# Patient Record
Sex: FEMALE | Race: WHITE | HISPANIC OR LATINO | Employment: STUDENT | URBAN - METROPOLITAN AREA
[De-identification: names, ages, dates, MRNs, and addresses within clinical notes are randomized per-mention and may not be internally consistent; named-entity substitution may affect disease eponyms.]

---
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
## 2022-05-06 ENCOUNTER — OFFICE VISIT (OUTPATIENT)
Dept: OBGYN CLINIC | Facility: CLINIC | Age: 22
End: 2022-05-06

## 2022-05-06 VITALS
WEIGHT: 127.8 LBS | SYSTOLIC BLOOD PRESSURE: 118 MMHG | HEIGHT: 60 IN | DIASTOLIC BLOOD PRESSURE: 58 MMHG | BODY MASS INDEX: 25.09 KG/M2

## 2022-05-06 DIAGNOSIS — N92.0 MENORRHAGIA WITH REGULAR CYCLE: ICD-10-CM

## 2022-05-06 DIAGNOSIS — Z01.419 WOMEN'S ANNUAL ROUTINE GYNECOLOGICAL EXAMINATION: Primary | ICD-10-CM

## 2022-05-06 DIAGNOSIS — Z12.39 ENCOUNTER FOR BREAST CANCER SCREENING USING NON-MAMMOGRAM MODALITY: ICD-10-CM

## 2022-05-06 DIAGNOSIS — Z30.41 ORAL CONTRACEPTIVE PILL SURVEILLANCE: ICD-10-CM

## 2022-05-06 PROCEDURE — 99385 PREV VISIT NEW AGE 18-39: CPT | Performed by: NURSE PRACTITIONER

## 2022-05-06 PROCEDURE — 1036F TOBACCO NON-USER: CPT | Performed by: NURSE PRACTITIONER

## 2022-05-06 PROCEDURE — 3725F SCREEN DEPRESSION PERFORMED: CPT | Performed by: NURSE PRACTITIONER

## 2022-05-06 PROCEDURE — 3008F BODY MASS INDEX DOCD: CPT | Performed by: NURSE PRACTITIONER

## 2022-05-06 PROCEDURE — 0503F POSTPARTUM CARE VISIT: CPT | Performed by: NURSE PRACTITIONER

## 2022-05-06 RX ORDER — NORGESTIMATE AND ETHINYL ESTRADIOL 7DAYSX3 LO
1 KIT ORAL DAILY
Qty: 90 TABLET | Refills: 4 | Status: SHIPPED | OUTPATIENT
Start: 2022-05-06

## 2022-05-06 NOTE — PROGRESS NOTES
[address] [facility] Josesito Quiros is a 25 y o  female who presents today for annual GYN exam  She is transferring care to our office today  She has no pap history due to her age  Has completed the HPV vaccine series  She is sexually active in a monogamous, long term relationship with a male partner  She has been using OCPs for menorrhagia with iron deficiency anemia  She reports menses as light and regular  Patient's last menstrual period was 2022  Her general medical history has been reviewed and she reports it as follows:    Past Medical History:   Diagnosis Date    Anemia      Past Surgical History:   Procedure Laterality Date    WISDOM TOOTH EXTRACTION       OB History        0    Para   0    Term   0       0    AB   0    Living   0       SAB   0    IAB   0    Ectopic   0    Multiple   0    Live Births   0               Social History     Tobacco Use    Smoking status: Never Smoker    Smokeless tobacco: Never Used   Vaping Use    Vaping Use: Never used   Substance Use Topics    Alcohol use: Not Currently    Drug use: Never     Social History     Substance and Sexual Activity   Sexual Activity Yes    Partners: Male    Birth control/protection: Condom Male, OCP     Cancer-related family history is negative for Breast cancer, Colon cancer, and Ovarian cancer  Current Outpatient Medications   Medication Instructions    ferrous sulfate 324 mg, Oral, 2 times daily before meals    mupirocin (BACTROBAN) 2 % ointment Topical, 2 times daily    norgestimate-ethinyl estradiol (ORTHO TRI-CYCLEN LO) 0 18/0 215/0 25 MG-25 MCG per tablet 1 tablet, Oral, Daily       Review of Systems:  Review of Systems   Constitutional: Negative  Gastrointestinal: Negative  Genitourinary: Negative  Skin: Negative          Physical Exam:  /58   Ht 5' (1 524 m)   Wt 58 kg (127 lb 12 8 oz)   LMP 2022   BMI 24 96 kg/m²   Physical Exam  Constitutional: General: She is not in acute distress  Appearance: Normal appearance  She is normal weight  Genitourinary:   Breasts:      Right: No mass, nipple discharge, skin change or tenderness  Left: No mass, nipple discharge, skin change or tenderness  Cardiovascular:      Rate and Rhythm: Normal rate and regular rhythm  Pulmonary:      Breath sounds: Normal breath sounds  Abdominal:      General: Abdomen is flat  Palpations: Abdomen is soft  There is no mass  Tenderness: There is no abdominal tenderness  Musculoskeletal:      Cervical back: Neck supple  No tenderness  Neurological:      Mental Status: She is alert  Skin:     General: Skin is warm and dry  Psychiatric:         Mood and Affect: Mood normal          Behavior: Behavior normal    Vitals reviewed  Assessment/Plan:   1  Normal well-woman GYN exam   2  Cervical cancer screening:  Discussed pap smear screenings to begin at age 24  Pelvic exam deferred today  3  STD screening:  Patient declines  4  Breast cancer screening:  Normal breast exam  Reviewed breast self-awareness  5  Depression Screening: Patient's depression screening was assessed with a PHQ-2 score of 0  Their PHQ-9 score was 0  Clinically patient does not have depression  No treatment is required  6  BMI Counseling: Body mass index is 24 96 kg/m²  Discussed the patient's BMI with her  The BMI is normal     7  Contraception:  OCPs  Refills provided x1 year   8  Return to office in one year for annual exam or sooner if needed  Reviewed with patient that [facility], but that they will not necessarily be reviewed by me immediately  Explained that I will review results at my earliest opportunity and contact patient appropriately

## 2022-06-22 ENCOUNTER — OFFICE VISIT (OUTPATIENT)
Dept: PEDIATRICS CLINIC | Facility: CLINIC | Age: 22
End: 2022-06-22
Payer: COMMERCIAL

## 2022-06-22 VITALS
BODY MASS INDEX: 21.4 KG/M2 | WEIGHT: 125.38 LBS | SYSTOLIC BLOOD PRESSURE: 118 MMHG | TEMPERATURE: 98.5 F | DIASTOLIC BLOOD PRESSURE: 62 MMHG | HEIGHT: 64 IN

## 2022-06-22 DIAGNOSIS — Z71.82 EXERCISE COUNSELING: ICD-10-CM

## 2022-06-22 DIAGNOSIS — Z01.00 EXAMINATION OF EYES AND VISION: ICD-10-CM

## 2022-06-22 DIAGNOSIS — Z00.129 HEALTH CHECK FOR CHILD OVER 28 DAYS OLD: Primary | ICD-10-CM

## 2022-06-22 DIAGNOSIS — Z01.00 VISUAL TESTING: ICD-10-CM

## 2022-06-22 DIAGNOSIS — Z13.31 SCREENING FOR DEPRESSION: ICD-10-CM

## 2022-06-22 DIAGNOSIS — Z11.4 SCREENING FOR HIV (HUMAN IMMUNODEFICIENCY VIRUS): ICD-10-CM

## 2022-06-22 DIAGNOSIS — Z01.10 AUDITORY ACUITY EVALUATION: ICD-10-CM

## 2022-06-22 DIAGNOSIS — Z13.220 SCREENING, LIPID: ICD-10-CM

## 2022-06-22 DIAGNOSIS — Z01.84 IMMUNITY STATUS TESTING: ICD-10-CM

## 2022-06-22 DIAGNOSIS — Z01.10 ENCOUNTER FOR HEARING EXAMINATION WITHOUT ABNORMAL FINDINGS: ICD-10-CM

## 2022-06-22 DIAGNOSIS — Z11.59 NEED FOR HEPATITIS C SCREENING TEST: ICD-10-CM

## 2022-06-22 DIAGNOSIS — Z11.3 SCREEN FOR SEXUALLY TRANSMITTED DISEASES: ICD-10-CM

## 2022-06-22 DIAGNOSIS — Z71.3 NUTRITIONAL COUNSELING: ICD-10-CM

## 2022-06-22 PROBLEM — S92.515A CLOSED NONDISPLACED FRACTURE OF PROXIMAL PHALANX OF LESSER TOE OF LEFT FOOT: Status: ACTIVE | Noted: 2021-05-25

## 2022-06-22 PROCEDURE — 87591 N.GONORRHOEAE DNA AMP PROB: CPT | Performed by: PEDIATRICS

## 2022-06-22 PROCEDURE — 3008F BODY MASS INDEX DOCD: CPT | Performed by: NURSE PRACTITIONER

## 2022-06-22 PROCEDURE — 87491 CHLMYD TRACH DNA AMP PROBE: CPT | Performed by: PEDIATRICS

## 2022-06-22 PROCEDURE — 96127 BRIEF EMOTIONAL/BEHAV ASSMT: CPT | Performed by: PEDIATRICS

## 2022-06-22 PROCEDURE — 92551 PURE TONE HEARING TEST AIR: CPT | Performed by: PEDIATRICS

## 2022-06-22 PROCEDURE — 99395 PREV VISIT EST AGE 18-39: CPT | Performed by: PEDIATRICS

## 2022-06-22 PROCEDURE — 99173 VISUAL ACUITY SCREEN: CPT | Performed by: PEDIATRICS

## 2022-06-22 NOTE — PATIENT INSTRUCTIONS
Wellness Visit for Adults   AMBULATORY CARE:   A wellness visit  is when you see your healthcare provider to get screened for health problems  Your healthcare provider will also give you advice on how to stay healthy  Write down your questions so you remember to ask them  Ask your healthcare provider how often you should have a wellness visit  What happens at a wellness visit:  Your healthcare provider will ask about your health, and your family history of health problems  This includes high blood pressure, heart disease, and cancer  He or she will ask if you have symptoms that concern you, if you smoke, and about your mood  You may also be asked about your intake of medicines, supplements, food, and alcohol  Any of the following may be done: Your weight  will be checked  Your height may also be checked so your body mass index (BMI) can be calculated  Your BMI shows if you are at a healthy weight  Your blood pressure  and heart rate will be checked  Your temperature may also be checked  Blood and urine tests  may be done  Blood tests may be done to check your cholesterol levels  Abnormal cholesterol levels increase your risk for heart disease and stroke  You may also need a blood or urine test to check for diabetes if you are at increased risk  Urine tests may be done to look for signs of an infection or kidney disease  A physical exam  includes checking your heartbeat and lungs with a stethoscope  Your healthcare provider may also check your skin to look for sun damage  Screening tests  may be recommended  A screening test is done to check for diseases that may not cause symptoms  The screening tests you may need depend on your age, gender, family history, and lifestyle habits  For example, colorectal screening may be recommended if you are 48years old or older  Screening tests you need if you are a woman:   A Pap smear  is used to screen for cervical cancer   Pap smears are usually done every 3 to 5 years depending on your age  You may need them more often if you have had abnormal Pap smear test results in the past  Ask your healthcare provider how often you should have a Pap smear  A mammogram  is an x-ray of your breasts to screen for breast cancer  Experts recommend mammograms every 2 years starting at age 48 years  You may need a mammogram at age 52 years or younger if you have an increased risk for breast cancer  Talk to your healthcare provider about when you should start having mammograms and how often you need them  Vaccines you may need:   Get an influenza vaccine  every year  The influenza vaccine protects you from the flu  Several types of viruses cause the flu  The viruses change over time, so new vaccines are made each year  Get a tetanus-diphtheria (Td) booster vaccine  every 10 years  This vaccine protects you against tetanus and diphtheria  Tetanus is a severe infection that may cause painful muscle spasms and lockjaw  Diphtheria is a severe bacterial infection that causes a thick covering in the back of your mouth and throat  Get a human papillomavirus (HPV) vaccine  if you are female and aged 23 to 32 or male 23 to 24 and never received it  This vaccine protects you from HPV infection  HPV is the most common infection spread by sexual contact  HPV may also cause vaginal, penile, and anal cancers  Get a pneumococcal vaccine  if you are aged 72 years or older  The pneumococcal vaccine is an injection given to protect you from pneumococcal disease  Pneumococcal disease is an infection caused by pneumococcal bacteria  The infection may cause pneumonia, meningitis, or an ear infection  Get a shingles vaccine  if you are 60 or older, even if you have had shingles before  The shingles vaccine is an injection to protect you from the varicella-zoster virus  This is the same virus that causes chickenpox   Shingles is a painful rash that develops in people who had chickenpox or have been exposed to the virus  How to eat healthy:  My Plate is a model for planning healthy meals  It shows the types and amounts of foods that should go on your plate  Fruits and vegetables make up about half of your plate, and grains and protein make up the other half  A serving of dairy is included on the side of your plate  The amount of calories and serving sizes you need depends on your age, gender, weight, and height  Examples of healthy foods are listed below:  Eat a variety of vegetables  such as dark green, red, and orange vegetables  You can also include canned vegetables low in sodium (salt) and frozen vegetables without added butter or sauces  Eat a variety of fresh fruits , canned fruit in 100% juice, frozen fruit, and dried fruit  Include whole grains  At least half of the grains you eat should be whole grains  Examples include whole-wheat bread, wheat pasta, brown rice, and whole-grain cereals such as oatmeal     Eat a variety of protein foods such as seafood (fish and shellfish), lean meat, and poultry without skin (turkey and chicken)  Examples of lean meats include pork leg, shoulder, or tenderloin, and beef round, sirloin, tenderloin, and extra lean ground beef  Other protein foods include eggs and egg substitutes, beans, peas, soy products, nuts, and seeds  Choose low-fat dairy products such as skim or 1% milk or low-fat yogurt, cheese, and cottage cheese  Limit unhealthy fats  such as butter, hard margarine, and shortening  Exercise:  Exercise at least 30 minutes per day on most days of the week  Some examples of exercise include walking, biking, dancing, and swimming  You can also fit in more physical activity by taking the stairs instead of the elevator or parking farther away from stores  Include muscle strengthening activities 2 days each week  Regular exercise provides many health benefits   It helps you manage your weight, and decreases your risk for type 2 diabetes, heart disease, stroke, and high blood pressure  Exercise can also help improve your mood  Ask your healthcare provider about the best exercise plan for you  General health and safety guidelines:   Do not smoke  Nicotine and other chemicals in cigarettes and cigars can cause lung damage  Ask your healthcare provider for information if you currently smoke and need help to quit  E-cigarettes or smokeless tobacco still contain nicotine  Talk to your healthcare provider before you use these products  Limit alcohol  A drink of alcohol is 12 ounces of beer, 5 ounces of wine, or 1½ ounces of liquor  Lose weight, if needed  Being overweight increases your risk of certain health conditions  These include heart disease, high blood pressure, type 2 diabetes, and certain types of cancer  Protect your skin  Do not sunbathe or use tanning beds  Use sunscreen with a SPF 15 or higher  Apply sunscreen at least 15 minutes before you go outside  Reapply sunscreen every 2 hours  Wear protective clothing, hats, and sunglasses when you are outside  Drive safely  Always wear your seatbelt  Make sure everyone in your car wears a seatbelt  A seatbelt can save your life if you are in an accident  Do not use your cell phone when you are driving  This could distract you and cause an accident  Pull over if you need to make a call or send a text message  Practice safe sex  Use latex condoms if are sexually active and have more than one partner  Your healthcare provider may recommend screening tests for sexually transmitted infections (STIs)  Wear helmets, lifejackets, and protective gear  Always wear a helmet when you ride a bike or motorcycle, go skiing, or play sports that could cause a head injury  Wear protective equipment when you play sports  Wear a lifejacket when you are on a boat or doing water sports      © Copyright [employer] 2022 Information is for End User's use only and may not be sold, redistributed or otherwise used for commercial purposes  All illustrations and images included in CareNotes® are the copyrighted property of A D A M , Inc  or Tristan Phipps  The above information is an  only  It is not intended as medical advice for individual conditions or treatments  Talk to your doctor, nurse or pharmacist before following any medical regimen to see if it is safe and effective for you

## 2022-06-22 NOTE — PROGRESS NOTES
Subjective:     Rose Belcher is a 25 y o  female who is brought in for this well child visit  History provided by: patient    Current Issues:  Current concerns: none  Pt is applying for nursing school through [employer]  Patient brought the admission papers to be filled out by me  regular periods, no issues    The following portions of the patient's history were reviewed and updated as appropriate: allergies, current medications, past family history, past medical history, past social history, past surgical history and problem list     Well Child Assessment:  History provided by: patient  Aditi Barragan lives with her mother and father  Nutrition  Types of intake include cereals, cow's milk, eggs, fish, fruits, juices, junk food, meats and vegetables  Junk food includes candy, chips, desserts and fast food (sometimes)  Dental  The patient has a dental home  The patient does not brush teeth regularly  The patient does not floss regularly  Last dental exam was less than 6 months ago  Elimination  Elimination problems do not include constipation, diarrhea or urinary symptoms  There is no bed wetting  Sleep  Average sleep duration is 7 hours  The patient does not snore  There are no sleep problems  Safety  There is no smoking in the home  Home has working smoke alarms? yes  Home has working carbon monoxide alarms? yes  There is no gun in home  School  Grade level in school: 3rd year of college at [employer]  There are no signs of learning disabilities  Child is doing well in school  Social  After school activity: pt works , part time              Objective:       Vitals:    06/22/22 1028   BP: 118/62   BP Location: Left arm   Patient Position: Sitting   Cuff Size: Standard   Temp: 98 5 °F (36 9 °C)   TempSrc: Tympanic   Weight: 56 9 kg (125 lb 6 oz)   Height: 5' 4 29" (1 633 m)     Growth parameters are noted and are appropriate for age      Wt Readings from Last 1 Encounters:   06/22/22 56 9 kg (125 lb 6 oz) (44 %, Z= -0 14)*     * Growth percentiles are based on [facility] (Girls, 2-20 Years) data  Ht Readings from Last 1 Encounters:   06/22/22 5' 4 29" (1 633 m) (50 %, Z= -0 01)*     * Growth percentiles are based on [facility] (Girls, 2-20 Years) data  Body mass index is 21 33 kg/m²  Vitals:    06/22/22 1028   BP: 118/62   BP Location: Left arm   Patient Position: Sitting   Cuff Size: Standard   Temp: 98 5 °F (36 9 °C)   TempSrc: Tympanic   Weight: 56 9 kg (125 lb 6 oz)   Height: 5' 4 29" (1 633 m)        Hearing Screening    125Hz 250Hz 500Hz 1000Hz 2000Hz 3000Hz 4000Hz 6000Hz 8000Hz   Right ear:   25 25 25  25     Left ear:   25 25 25  25        Visual Acuity Screening    Right eye Left eye Both eyes   Without correction: 20/25 20/25 20/20   With correction:          Physical Exam  Constitutional:       General: She is not in acute distress  Appearance: Normal appearance  She is well-developed and normal weight  HENT:      Head: Normocephalic  Right Ear: Tympanic membrane, ear canal and external ear normal       Left Ear: Tympanic membrane, ear canal and external ear normal       Nose: Nose normal       Mouth/Throat:      Mouth: Mucous membranes are moist       Pharynx: Uvula midline  Comments: Teeth are wnl  Eyes:      General: Lids are normal       Extraocular Movements: Extraocular movements intact  Conjunctiva/sclera: Conjunctivae normal       Pupils: Pupils are equal, round, and reactive to light  Cardiovascular:      Rate and Rhythm: Normal rate and regular rhythm  Pulses: Normal pulses  Femoral pulses are 2+ on the right side and 2+ on the left side  Heart sounds: Normal heart sounds  No murmur (No murmurs heard) heard  Pulmonary:      Effort: Pulmonary effort is normal  No respiratory distress  Breath sounds: Normal breath sounds  Comments: Breasts are wnl   No lumps appreciated on exam henry 5   Abdominal:      General: Bowel sounds are normal  There is no distension  Palpations: Abdomen is soft  There is no mass  Tenderness: There is no abdominal tenderness  Comments: No hepatosplenomegaly felt   Genitourinary:     Comments: deferred  Musculoskeletal:         General: No deformity  Normal range of motion  Cervical back: Neck supple  Comments: No abnormality noted  Muscle tone seems normal   No joint swelling  Range of motion of joints seems normal    Scoliosis noted: no   Lymphadenopathy:      Cervical: No cervical adenopathy  Skin:     General: Skin is warm  Capillary Refill: Capillary refill takes less than 2 seconds  Coloration: Skin is not pale  Neurological:      General: No focal deficit present  Mental Status: She is alert and oriented to person, place, and time  Cranial Nerves: No cranial nerve deficit  Deep Tendon Reflexes: Reflexes are normal and symmetric  Comments: No neurological abnormality noted   Psychiatric:         Mood and Affect: Mood normal          Behavior: Behavior normal          Thought Content: Thought content normal            Assessment:     Well adolescent  1  Health check for child over 34 days old     2  Screening for depression     3  Auditory acuity evaluation     4  Examination of eyes and vision     5  Immunity status testing  Quantiferon TB Gold Plus    Hepatitis B surface antibody   6  Need for hepatitis C screening test  Hepatitis C RNA, quantitative, PCR   7  Screening, lipid  Lipid panel   8  Screen for sexually transmitted diseases  Chlamydia/GC amplified DNA by PCR   9  Encounter for hearing examination without abnormal findings     10  Visual testing     11  Screening for HIV (human immunodeficiency virus)  HIV 1/2 Antigen/Antibody (4th Generation) w Reflex [facility]   12  Body mass index, pediatric, 5th percentile to less than 85th percentile for age     15  Exercise counseling     14  Nutritional counseling          Plan:         1  Anticipatory guidance discussed  Specific topics reviewed: bicycle helmets, breast self-exam, drugs, ETOH, and tobacco, importance of regular dental care, importance of regular exercise, importance of varied diet, minimize junk food, puberty, seat belts and sex; STD and pregnancy prevention  Depression Screening and Follow-up Plan: Patient was screened for depression during today's encounter  They screened negative with a PHQ-2 score of 0         2  Development: appropriate for age    1  Immunizations today: per orders  Vaccine Counseling: Discussed with: Ped parent/guardian: patient   The benefits, contraindication and side effects for the following vaccines were reviewed: Immunization component list: none  Total number of components reveiwed:0    4  Follow-up visit in 1 year for next well child visit, or sooner as needed

## 2022-06-23 LAB
C TRACH DNA SPEC QL NAA+PROBE: NEGATIVE
N GONORRHOEA DNA SPEC QL NAA+PROBE: NEGATIVE

## 2022-07-03 ENCOUNTER — LAB (OUTPATIENT)
Dept: LAB | Facility: HOSPITAL | Age: 22
End: 2022-07-03
Attending: PEDIATRICS
Payer: COMMERCIAL

## 2022-07-03 DIAGNOSIS — Z13.220 SCREENING, LIPID: ICD-10-CM

## 2022-07-03 DIAGNOSIS — Z01.84 IMMUNITY STATUS TESTING: ICD-10-CM

## 2022-07-03 DIAGNOSIS — Z11.4 SCREENING FOR HIV (HUMAN IMMUNODEFICIENCY VIRUS): ICD-10-CM

## 2022-07-03 DIAGNOSIS — D50.9 IRON DEFICIENCY ANEMIA, UNSPECIFIED IRON DEFICIENCY ANEMIA TYPE: ICD-10-CM

## 2022-07-03 DIAGNOSIS — Z11.59 NEED FOR HEPATITIS C SCREENING TEST: ICD-10-CM

## 2022-07-03 LAB
BASOPHILS # BLD AUTO: 0.03 THOUSANDS/ΜL (ref 0–0.1)
BASOPHILS NFR BLD AUTO: 1 % (ref 0–1)
CHOLEST SERPL-MCNC: 178 MG/DL
EOSINOPHIL # BLD AUTO: 0.08 THOUSAND/ΜL (ref 0–0.61)
EOSINOPHIL NFR BLD AUTO: 2 % (ref 0–6)
ERYTHROCYTE [DISTWIDTH] IN BLOOD BY AUTOMATED COUNT: 18.7 % (ref 11.6–15.1)
HBV SURFACE AB SER-ACNC: 21.67 MIU/ML
HCT VFR BLD AUTO: 28.4 % (ref 34.8–46.1)
HDLC SERPL-MCNC: 59 MG/DL
HGB BLD-MCNC: 7.8 G/DL (ref 11.5–15.4)
IMM GRANULOCYTES # BLD AUTO: 0.01 THOUSAND/UL (ref 0–0.2)
IMM GRANULOCYTES NFR BLD AUTO: 0 % (ref 0–2)
LDLC SERPL CALC-MCNC: 101 MG/DL (ref 0–100)
LYMPHOCYTES # BLD AUTO: 1.87 THOUSANDS/ΜL (ref 0.6–4.47)
LYMPHOCYTES NFR BLD AUTO: 39 % (ref 14–44)
MCH RBC QN AUTO: 17.3 PG (ref 26.8–34.3)
MCHC RBC AUTO-ENTMCNC: 27.5 G/DL (ref 31.4–37.4)
MCV RBC AUTO: 63 FL (ref 82–98)
MONOCYTES # BLD AUTO: 0.41 THOUSAND/ΜL (ref 0.17–1.22)
MONOCYTES NFR BLD AUTO: 9 % (ref 4–12)
NEUTROPHILS # BLD AUTO: 2.35 THOUSANDS/ΜL (ref 1.85–7.62)
NEUTS SEG NFR BLD AUTO: 49 % (ref 43–75)
NONHDLC SERPL-MCNC: 119 MG/DL
NRBC BLD AUTO-RTO: 0 /100 WBCS
PLATELET # BLD AUTO: 583 THOUSANDS/UL (ref 149–390)
PMV BLD AUTO: 8.5 FL (ref 8.9–12.7)
RBC # BLD AUTO: 4.51 MILLION/UL (ref 3.81–5.12)
TRIGL SERPL-MCNC: 89 MG/DL
WBC # BLD AUTO: 4.75 THOUSAND/UL (ref 4.31–10.16)

## 2022-07-03 PROCEDURE — 85025 COMPLETE CBC W/AUTO DIFF WBC: CPT

## 2022-07-03 PROCEDURE — 86706 HEP B SURFACE ANTIBODY: CPT

## 2022-07-03 PROCEDURE — 86480 TB TEST CELL IMMUN MEASURE: CPT

## 2022-07-03 PROCEDURE — 87389 HIV-1 AG W/HIV-1&-2 AB AG IA: CPT

## 2022-07-03 PROCEDURE — 87522 HEPATITIS C REVRS TRNSCRPJ: CPT

## 2022-07-03 PROCEDURE — 80061 LIPID PANEL: CPT

## 2022-07-03 PROCEDURE — 36415 COLL VENOUS BLD VENIPUNCTURE: CPT

## 2022-07-04 LAB — HIV 1+2 AB+HIV1 P24 AG SERPL QL IA: NORMAL

## 2022-07-05 LAB
GAMMA INTERFERON BACKGROUND BLD IA-ACNC: 0.09 IU/ML
M TB IFN-G BLD-IMP: NEGATIVE
M TB IFN-G CD4+ BCKGRND COR BLD-ACNC: -0.01 IU/ML
M TB IFN-G CD4+ BCKGRND COR BLD-ACNC: -0.02 IU/ML
MITOGEN IGNF BCKGRD COR BLD-ACNC: >10 IU/ML

## 2022-07-06 LAB
HCV RNA SERPL NAA+PROBE-ACNC: NORMAL IU/ML
TEST INFORMATION: NORMAL

## 2022-07-07 ENCOUNTER — TELEPHONE (OUTPATIENT)
Dept: PEDIATRICS CLINIC | Facility: CLINIC | Age: 22
End: 2022-07-07

## 2022-07-07 DIAGNOSIS — D64.9 ANEMIA, UNSPECIFIED TYPE: Primary | ICD-10-CM

## 2022-07-07 NOTE — RESULT ENCOUNTER NOTE
Spoke with patient and reviewed her test results  I told her that the cbc still shows anemia and this is similar to the other previous cbc  I told her that we would need to evaluate her anemia including hemoglobin electrophoresis and occult blood is feces  Pt reported that every 2 months she has seen blood in her stool   I told he if that is the case, we would refer her to a gastroenterologist

## 2022-08-07 ENCOUNTER — LAB (OUTPATIENT)
Dept: LAB | Facility: HOSPITAL | Age: 22
End: 2022-08-07
Attending: PEDIATRICS
Payer: COMMERCIAL

## 2022-08-07 DIAGNOSIS — D64.9 ANEMIA, UNSPECIFIED TYPE: ICD-10-CM

## 2022-08-07 LAB
BASOPHILS # BLD AUTO: 0.04 THOUSANDS/ΜL (ref 0–0.1)
BASOPHILS NFR BLD AUTO: 1 % (ref 0–1)
EOSINOPHIL # BLD AUTO: 0.06 THOUSAND/ΜL (ref 0–0.61)
EOSINOPHIL NFR BLD AUTO: 1 % (ref 0–6)
ERYTHROCYTE [DISTWIDTH] IN BLOOD BY AUTOMATED COUNT: 19.1 % (ref 11.6–15.1)
FERRITIN SERPL-MCNC: 1 NG/ML (ref 8–388)
HCT VFR BLD AUTO: 27.8 % (ref 34.8–46.1)
HGB BLD-MCNC: 7.7 G/DL (ref 11.5–15.4)
IMM GRANULOCYTES # BLD AUTO: 0.02 THOUSAND/UL (ref 0–0.2)
IMM GRANULOCYTES NFR BLD AUTO: 0 % (ref 0–2)
IRON SERPL-MCNC: 18 UG/DL (ref 50–170)
LYMPHOCYTES # BLD AUTO: 1.99 THOUSANDS/ΜL (ref 0.6–4.47)
LYMPHOCYTES NFR BLD AUTO: 37 % (ref 14–44)
MCH RBC QN AUTO: 17.5 PG (ref 26.8–34.3)
MCHC RBC AUTO-ENTMCNC: 27.7 G/DL (ref 31.4–37.4)
MCV RBC AUTO: 63 FL (ref 82–98)
MONOCYTES # BLD AUTO: 0.34 THOUSAND/ΜL (ref 0.17–1.22)
MONOCYTES NFR BLD AUTO: 6 % (ref 4–12)
NEUTROPHILS # BLD AUTO: 2.99 THOUSANDS/ΜL (ref 1.85–7.62)
NEUTS SEG NFR BLD AUTO: 55 % (ref 43–75)
NRBC BLD AUTO-RTO: 0 /100 WBCS
PLATELET # BLD AUTO: 575 THOUSANDS/UL (ref 149–390)
PMV BLD AUTO: 8.8 FL (ref 8.9–12.7)
RBC # BLD AUTO: 4.39 MILLION/UL (ref 3.81–5.12)
RETICS # AUTO: NORMAL 10*3/UL (ref 14097–95744)
RETICS # CALC: 1.13 % (ref 0.37–1.87)
TIBC SERPL-MCNC: 586 UG/DL (ref 250–450)
WBC # BLD AUTO: 5.44 THOUSAND/UL (ref 4.31–10.16)

## 2022-08-07 PROCEDURE — 36415 COLL VENOUS BLD VENIPUNCTURE: CPT

## 2022-08-07 PROCEDURE — 85025 COMPLETE CBC W/AUTO DIFF WBC: CPT

## 2022-08-07 PROCEDURE — 83020 HEMOGLOBIN ELECTROPHORESIS: CPT

## 2022-08-07 PROCEDURE — 85045 AUTOMATED RETICULOCYTE COUNT: CPT

## 2022-08-07 PROCEDURE — 82728 ASSAY OF FERRITIN: CPT

## 2022-08-07 PROCEDURE — 83540 ASSAY OF IRON: CPT

## 2022-08-07 PROCEDURE — 83550 IRON BINDING TEST: CPT

## 2022-08-09 ENCOUNTER — APPOINTMENT (OUTPATIENT)
Dept: LAB | Facility: HOSPITAL | Age: 22
End: 2022-08-09
Attending: PEDIATRICS
Payer: COMMERCIAL

## 2022-08-09 DIAGNOSIS — D64.9 ANEMIA, UNSPECIFIED TYPE: ICD-10-CM

## 2022-08-09 LAB — HEMOCCULT STL QL IA: NEGATIVE

## 2022-08-09 PROCEDURE — G0328 FECAL BLOOD SCRN IMMUNOASSAY: HCPCS

## 2022-08-10 LAB
HGB A MFR BLD: 2 % (ref 1.8–3.2)
HGB A MFR BLD: 98 % (ref 96.4–98.8)
HGB F MFR BLD: 0 % (ref 0–2)
HGB FRACT BLD-IMP: NORMAL
HGB S MFR BLD: 0 %

## 2022-08-19 DIAGNOSIS — D64.9 CHRONIC ANEMIA: Primary | ICD-10-CM

## 2022-08-19 DIAGNOSIS — D50.9 IRON DEFICIENCY ANEMIA, UNSPECIFIED IRON DEFICIENCY ANEMIA TYPE: ICD-10-CM

## 2022-08-19 RX ORDER — FERROUS SULFATE TAB EC 324 MG (65 MG FE EQUIVALENT) 324 (65 FE) MG
324 TABLET DELAYED RESPONSE ORAL
Qty: 60 TABLET | Refills: 2 | Status: SHIPPED | OUTPATIENT
Start: 2022-08-19 | End: 2023-07-13 | Stop reason: SDUPTHER

## 2022-09-09 ENCOUNTER — TELEPHONE (OUTPATIENT)
Dept: HEMATOLOGY ONCOLOGY | Facility: CLINIC | Age: 22
End: 2022-09-09

## 2022-09-09 NOTE — TELEPHONE ENCOUNTER
Appointment Cancellation Or Reschedule     Person calling in Patient    Provider Gaurav Visit Date and Time 9/9/22 @ 2pm   Office Visit Location [facility]   Did patient want to reschedule their office appointment? If so, when was it scheduled to? Yes 9/23/22 @ 2pm   Did you have STAR scheduled for this appointment? No   Do you need STAR set up for your new appointment? If yes, please send to "PATIENT RIDESHARE" pool for STAR rescheduling No   If you are cancelling appointment, can we notify STAR to cancel ride? If yes, please send to "PATIENT RIDESHARE" pool for STAR to cancel service NA   Is this patient calling to reschedule an infusion appointment? No   When is their next infusion appointment? NA   Is this patient a Chemo patient? No   Reason for Cancellation or Reschedule Patient isn't feeling well and needed to R/S      If the patient is a treatment patient, please route this to the office nurse  If the patient is not on treatment, please route to the office MA  If the patient is a surgical oncology patient, please route to surg/onc clinical pool

## 2022-09-23 ENCOUNTER — CONSULT (OUTPATIENT)
Dept: HEMATOLOGY ONCOLOGY | Facility: CLINIC | Age: 22
End: 2022-09-23
Payer: COMMERCIAL

## 2022-09-23 VITALS
DIASTOLIC BLOOD PRESSURE: 64 MMHG | WEIGHT: 125 LBS | SYSTOLIC BLOOD PRESSURE: 102 MMHG | TEMPERATURE: 98.6 F | BODY MASS INDEX: 21.34 KG/M2 | HEIGHT: 64 IN

## 2022-09-23 DIAGNOSIS — D64.9 CHRONIC ANEMIA: ICD-10-CM

## 2022-09-23 DIAGNOSIS — D50.8 OTHER IRON DEFICIENCY ANEMIA: Primary | ICD-10-CM

## 2022-09-23 PROCEDURE — 99204 OFFICE O/P NEW MOD 45 MIN: CPT | Performed by: PHYSICIAN ASSISTANT

## 2022-09-23 NOTE — PATIENT INSTRUCTIONS
Stool softener: docusate sodium 1-2 tablets daily for constipation       Iron Rich Diet   WHAT YOU NEED TO KNOW:   What is an iron-rich diet? An iron-rich diet includes foods that are good sources of iron  People need extra iron during childhood, adolescence (teenage years), and pregnancy  Iron is a mineral that your body needs to make hemoglobin  Hemoglobin is part of your blood and helps carry oxygen from your lungs to the rest of your body  Eat iron-rich foods and vitamin C every day to prevent iron deficiency anemia  Iron deficiency anemia can lead to other health problems in adults and growth or development problems in children  How much iron do I need each day? Males:      3to 1years old: 7 mg    3to 6years old: 10 mg    5to 15years old: 8 mg    15to 25years old: 11 mg    19 years and older: 8 mg    Females:      3to 1years old: 7 mg    3to 6years old: 10 mg    5to 15years old: 8 mg    15to 25years old: 15 mg    19 to 50 years: 18 mg    Over 46years old: 8 mg    Pregnant women:  27 mg    Which foods contain iron? Meat, fish, and poultry are good sources of iron  They contain heme iron, a form of iron that your body absorbs very well  Fruit, vegetables, eggs, and grains such as pasta, rice, and cereal also contain iron  They contain nonheme iron, a form of iron that is not absorbed as well as heme iron  You can absorb more iron from these foods by eating a food that is high in vitamin C at the same time  You can also absorb more nonheme iron by eating a food from the meat, fish, and poultry group at the same time  Fish and shellfish contain some mercury, a metal that can be harmful  Children and unborn babies are at higher risk for harm caused by mercury  Children and pregnant women should avoid eating fish high in mercury, such as shark and swordfish  They should also eat only fish that are lower in mercury, such as salmon, canned light tuna, and catfish   Limit the amount of low-mercury fish and shellfish you eat to less than 12 ounces per week  What are some iron-rich foods? Foods that contain 2 mg or more per serving:      3 ounces of cooked beef (donaldo, eye of round) or cooked turkey (dark meat)    ½ cup of beans (black, kidney, or lentil, or soybeans)    ½ cup of tofu    1 medium baked potato    1 cup of cooked artichoke or cooked spinach    ¾ cup of instant oatmeal    1 cup of corn flakes    Foods that contain 1 to 2 mg per serving:      3 ounces of chicken    3 ounces of pork    3 ounces of turkey (light meat)    3 ounces of light tuna    ½ cup of seedless, packed raisins    1 slice of whole-wheat or white bread       What are good sources of vitamin C? Eat a serving of vitamin C with any iron-rich food to help your body absorb more iron  The following fruits and vegetables are good sources of vitamin C:  1 cup of fresh orange juice (124 mg) or pink grapefruit juice (83 mg)    1 cup of strawberries (106 mg)    1 cup of diced cantaloupe (68 mg)    1 cup of sweet yellow pepper (283 mg)    1 cup of fresh, boiled broccoli (116 mg) or cooked brussels sprouts (97 mg)    1 cup of kale (53 mg)    1 cup of tomato juice (45 mg)       What other guidelines should I follow? Tea and coffee can decrease the amount of iron that your body absorbs from iron-rich foods  Drink coffee and tea separately from meals that contain iron-rich foods  Have foods and liquids high in calcium separately from iron-rich foods  Calcium prevents iron from being absorbed  Cow's milk and products made from it, such as cheese and yogurt, are high in calcium  Children older than 1 year only need about 24 ounces of cow's milk each day  Other foods high in calcium include leafy greens, green vegetables, almonds, and canned sardines  CARE AGREEMENT:   You have the right to help plan your care  Discuss treatment options with your healthcare provider to decide what care you want to receive   You always have the right to refuse treatment  The above information is an  only  It is not intended as medical advice for individual conditions or treatments  Talk to your doctor, nurse or pharmacist before following any medical regimen to see if it is safe and effective for you  © Copyright "[employer] 2022 Information is for End User's use only and may not be sold, redistributed or otherwise used for commercial purposes   All illustrations and images included in CareNotes® are the copyrighted property of [employer]  or [address] [place] [address]

## 2022-11-23 ENCOUNTER — TELEPHONE (OUTPATIENT)
Dept: HEMATOLOGY ONCOLOGY | Facility: CLINIC | Age: 22
End: 2022-11-23

## 2023-05-15 DIAGNOSIS — Z30.41 ORAL CONTRACEPTIVE PILL SURVEILLANCE: ICD-10-CM

## 2023-05-15 DIAGNOSIS — N92.0 MENORRHAGIA WITH REGULAR CYCLE: ICD-10-CM

## 2023-05-15 RX ORDER — NORGESTIMATE AND ETHINYL ESTRADIOL 7DAYSX3 LO
1 KIT ORAL DAILY
Qty: 90 TABLET | Refills: 0 | Status: SHIPPED | OUTPATIENT
Start: 2023-05-15

## 2023-06-01 ENCOUNTER — ANNUAL EXAM (OUTPATIENT)
Dept: OBGYN CLINIC | Facility: CLINIC | Age: 23
End: 2023-06-01

## 2023-06-01 VITALS
BODY MASS INDEX: 22.02 KG/M2 | HEART RATE: 89 BPM | DIASTOLIC BLOOD PRESSURE: 67 MMHG | SYSTOLIC BLOOD PRESSURE: 106 MMHG | RESPIRATION RATE: 18 BRPM | HEIGHT: 64 IN | WEIGHT: 129 LBS

## 2023-06-01 DIAGNOSIS — N92.0 MENORRHAGIA WITH REGULAR CYCLE: ICD-10-CM

## 2023-06-01 DIAGNOSIS — Z30.41 ORAL CONTRACEPTIVE PILL SURVEILLANCE: ICD-10-CM

## 2023-06-01 RX ORDER — NORGESTIMATE AND ETHINYL ESTRADIOL 7DAYSX3 LO
1 KIT ORAL DAILY
Qty: 90 TABLET | Refills: 3 | Status: SHIPPED | OUTPATIENT
Start: 2023-06-01

## 2023-06-01 NOTE — PROGRESS NOTES
"[address] [facility] Bharat Waterman is a 23 y o  female who presents today for annual GYN exam   She has not yet started pap screenings due to age  She has received the HPV vaccine series  She is sexually active in a long term monogamous relationship  She reports menses as regular  She is using OCPs for control of menorrhagia and for contraception  Patient's last menstrual period was 2023 (exact date)  Her general medical history has been reviewed and she reports it as follows:    Past Medical History:   Diagnosis Date   • Anemia      Past Surgical History:   Procedure Laterality Date   • WISDOM TOOTH EXTRACTION       OB History        0    Para   0    Term   0       0    AB   0    Living   0       SAB   0    IAB   0    Ectopic   0    Multiple   0    Live Births   0               Social History     Tobacco Use   • Smoking status: Never   • Smokeless tobacco: Never   Vaping Use   • Vaping Use: Never used   Substance Use Topics   • Alcohol use: Not Currently   • Drug use: Never     Social History     Substance and Sexual Activity   Sexual Activity Yes   • Partners: Male   • Birth control/protection: Condom Male, OCP     Cancer-related family history is negative for Breast cancer, Colon cancer, and Ovarian cancer  Current Outpatient Medications   Medication Instructions   • ferrous sulfate 324 mg, Oral, 2 times daily before meals   • mupirocin (BACTROBAN) 2 % ointment Topical, 2 times daily   • norgestimate-ethinyl estradiol (ORTHO TRI-CYCLEN LO) 0 18/0 215/0 25 MG-25 MCG per tablet 1 tablet, Oral, Daily       Review of Systems:  Review of Systems   Constitutional: Negative  Gastrointestinal: Negative  Genitourinary: Negative          Physical Exam:  /67 (BP Location: Right arm, Patient Position: Sitting, Cuff Size: Adult)   Pulse 89   Resp 18   Ht 5' 4\" (1 626 m)   Wt 58 5 kg (129 lb)   LMP 2023 (Exact Date)   BMI 22 14 kg/m²   Physical " Exam  Constitutional:       General: She is not in acute distress  Appearance: Normal appearance  Genitourinary:      Bladder normal    Cardiovascular:      Rate and Rhythm: Normal rate and regular rhythm  Pulmonary:      Effort: Pulmonary effort is normal       Breath sounds: Normal breath sounds  Abdominal:      General: Abdomen is flat  Palpations: Abdomen is soft  Musculoskeletal:      Cervical back: Neck supple  Neurological:      Mental Status: She is alert  Skin:     General: Skin is warm and dry  Psychiatric:         Mood and Affect: Mood normal          Behavior: Behavior normal    Vitals reviewed  Assessment/Plan:   1  Normal well-woman GYN exam   2  Cervical cancer screening: To begin pap screenings next year at age 26  Has received HPV vaccine in the past    3  STD screening:  Patient declines  4  Breast cancer screening:  Normal breast exam  Reviewed breast self-awareness  5  Depression Screening: Patient's depression screening was assessed with a PHQ-2 score of 0  Their PHQ-9 score was 0  Clinically patient does not have depression  No treatment is required  6  BMI Counseling: Body mass index is 22 14 kg/m²  Discussed the patient's BMI with her  The BMI is normal     7  Contraception: Happy with OCPs  Refills approved  8  Return to office in one year for annual exam     Reviewed with patient that test results are available in [facility] immediately, but that they will not necessarily be reviewed by me immediately  Explained that I will review results at my earliest opportunity and contact patient appropriately

## 2023-06-12 ENCOUNTER — TELEPHONE (OUTPATIENT)
Dept: PEDIATRICS CLINIC | Facility: CLINIC | Age: 23
End: 2023-06-12

## 2023-06-12 NOTE — TELEPHONE ENCOUNTER
Spoke with this patient and she will be following up with a [facility]    Please remove us as pcp

## 2023-06-12 NOTE — TELEPHONE ENCOUNTER
Becca Donnelly called asking us to put in a script for a tb blood test   She will then be contacting one of the providers I gave her for family practice  She was here 6/22/22 for her last well with Dr Anitra Feliz

## 2023-06-12 NOTE — TELEPHONE ENCOUNTER
Please inform patient that she has to follow up with her new provider as she has aged out of the practice

## 2023-06-16 ENCOUNTER — COMPLETE EYE EXAM (OUTPATIENT)
Dept: URBAN - METROPOLITAN AREA CLINIC 6 | Facility: CLINIC | Age: 23
End: 2023-06-16

## 2023-06-16 DIAGNOSIS — H52.13: ICD-10-CM

## 2023-06-16 DIAGNOSIS — Z01.00: ICD-10-CM

## 2023-06-16 PROCEDURE — 92015 DETERMINE REFRACTIVE STATE: CPT

## 2023-06-16 PROCEDURE — 92012 INTRM OPH EXAM EST PATIENT: CPT

## 2023-06-16 ASSESSMENT — VISUAL ACUITY
OU_CC: J1+
OS_CC: 20/20
OD_CC: 20/20

## 2023-06-16 ASSESSMENT — TONOMETRY
OD_IOP_MMHG: 16
OS_IOP_MMHG: 15

## 2023-06-19 NOTE — TELEPHONE ENCOUNTER
06/19/23 1:24 PM     The office's request has been received, reviewed, and noted that it no longer requires attention; new  PCP office will update PCP field at time of 1st visit  This message will now be completed      Thank you  Cresencio Perea

## 2023-07-13 ENCOUNTER — OFFICE VISIT (OUTPATIENT)
Dept: FAMILY MEDICINE CLINIC | Facility: CLINIC | Age: 23
End: 2023-07-13
Payer: COMMERCIAL

## 2023-07-13 VITALS
DIASTOLIC BLOOD PRESSURE: 70 MMHG | TEMPERATURE: 97.6 F | OXYGEN SATURATION: 98 % | WEIGHT: 129.2 LBS | SYSTOLIC BLOOD PRESSURE: 110 MMHG | HEART RATE: 88 BPM | BODY MASS INDEX: 22.06 KG/M2 | RESPIRATION RATE: 16 BRPM | HEIGHT: 64 IN

## 2023-07-13 DIAGNOSIS — Z11.1 TUBERCULOSIS SCREENING: ICD-10-CM

## 2023-07-13 DIAGNOSIS — Z76.89 ENCOUNTER TO ESTABLISH CARE: Primary | ICD-10-CM

## 2023-07-13 DIAGNOSIS — Z01.84 IMMUNITY STATUS TESTING: ICD-10-CM

## 2023-07-13 DIAGNOSIS — Z91.89 RISK OF EXPOSURE TO COMMUNICABLE DISEASE: ICD-10-CM

## 2023-07-13 DIAGNOSIS — D50.9 IRON DEFICIENCY ANEMIA, UNSPECIFIED IRON DEFICIENCY ANEMIA TYPE: ICD-10-CM

## 2023-07-13 PROCEDURE — 99214 OFFICE O/P EST MOD 30 MIN: CPT | Performed by: NURSE PRACTITIONER

## 2023-07-13 RX ORDER — FERROUS SULFATE TAB EC 324 MG (65 MG FE EQUIVALENT) 324 (65 FE) MG
324 TABLET DELAYED RESPONSE ORAL
Qty: 60 TABLET | Refills: 2 | Status: SHIPPED | OUTPATIENT
Start: 2023-07-13

## 2023-07-13 NOTE — PROGRESS NOTES
Assessment/Plan:    1. Encounter to establish care    2. Tuberculosis screening  -     Quantiferon TB Gold Plus; Future    3. Immunity status testing  -     Measles/Mumps/Rubella Immunity; Future  -     Varicella zoster antibody, IgG; Future    4. Risk of exposure to communicable disease  -     Quantiferon TB Gold Plus; Future  -     Measles/Mumps/Rubella Immunity; Future  -     Varicella zoster antibody, IgG; Future    5. Iron deficiency anemia, unspecified iron deficiency anemia type  -     ferrous sulfate 324 (65 Fe) mg; Take 1 tablet (324 mg total) by mouth 2 (two) times a day before meals        The case discussed with patient using patient centered shared decision making. The patient was counseled regarding instructions for management,-- risk factor reductions,-- prognosis,-- impressions,-- risks and benefits of treatment options,-- importance of compliance with treatment. I have reviewed the instructions with the patient, answering all questions to her satisfaction. in good health  Cont treatment for ALYSA  I will call results-treatment plan to be determined            Return in about 1 year (around 7/13/2024). I have spent a total time of 30 minutes on 07/13/23 in caring for this patient including Diagnostic results, Prognosis, Risks and benefits of tx options, Instructions for management, Patient and family education, Importance of tx compliance, Risk factor reductions, Impressions, Counseling / Coordination of care, Documenting in the medical record, Reviewing / ordering tests, medicine, procedures   and Obtaining or reviewing history  . Subjective:      Patient ID: Anai Chen is a 26 y.o. female.     Chief Complaint   Patient presents with   • Establish Care     Establish care       25 yo female presents to establish care  Was seeing pediatrician previously    She is in usual state of health  She is attending nursing program at [facility]    She needs titers, tb testing    She is followed by [facility] for iron deficiency anemia  She is utd with GYN care    She has no c/o today    Reviewed medical history in detail. Changes to medical record changed where appropriate. Reviewed medications. Patient taking as prescribed. Tolerating well. Denies Side effects. Reviewed recent visits with specialists co-managing chronic conditions. The following portions of the patient's history were reviewed and updated as appropriate: allergies, current medications, past family history, past medical history, past social history, past surgical history and problem list.    Review of Systems   Constitutional: Negative for fatigue, fever and unexpected weight change. HENT: Negative for trouble swallowing. Respiratory: Negative for cough and shortness of breath. Cardiovascular: Negative for chest pain, palpitations and leg swelling. Gastrointestinal: Negative for abdominal pain and blood in stool. Endocrine: Negative. Genitourinary: Negative for difficulty urinating and hematuria. Skin: Negative for pallor. Neurological: Negative for dizziness, tremors, seizures, syncope and weakness. Psychiatric/Behavioral: Negative for confusion and dysphoric mood. The patient is not nervous/anxious. Current Outpatient Medications   Medication Sig Dispense Refill   • ferrous sulfate 324 (65 Fe) mg Take 1 tablet (324 mg total) by mouth 2 (two) times a day before meals 60 tablet 2   • norgestimate-ethinyl estradiol (ORTHO TRI-CYCLEN LO) 0.18/0.215/0.25 MG-25 MCG per tablet Take 1 tablet by mouth daily 90 tablet 3   • mupirocin (BACTROBAN) 2 % ointment Apply topically 2 (two) times a day for 7 days 22 g 1     No current facility-administered medications for this visit.        Patient Active Problem List   Diagnosis   • Allergic rhinitis   • Shortness of breath on exertion   • Vitamin D insufficiency   • Iron deficiency anemia   • Menorrhagia with regular cycle   • Closed nondisplaced fracture of proximal phalanx of lesser toe of left foot       Objective:    /70 (BP Location: Left arm, Patient Position: Sitting, Cuff Size: Standard)   Pulse 88   Temp 97.6 °F (36.4 °C) (Temporal)   Resp 16   Ht 5' 4" (1.626 m)   Wt 58.6 kg (129 lb 3.2 oz)   SpO2 98%   BMI 22.18 kg/m²        Physical Exam  Vitals and nursing note reviewed. Constitutional:       General: She is not in acute distress. Appearance: Normal appearance. HENT:      Head: Normocephalic and atraumatic. Neck:      Vascular: No carotid bruit. Cardiovascular:      Rate and Rhythm: Normal rate and regular rhythm. No extrasystoles are present. Pulses: Normal pulses. Heart sounds: Normal heart sounds. No gallop. No S3 or S4 sounds. Pulmonary:      Effort: Pulmonary effort is normal.      Breath sounds: Normal breath sounds. Abdominal:      Palpations: Abdomen is soft. Tenderness: There is no abdominal tenderness. Musculoskeletal:      Right lower leg: No edema. Left lower leg: No edema. Lymphadenopathy:      Cervical: No cervical adenopathy. Skin:     Coloration: Skin is not pale. Neurological:      General: No focal deficit present. Mental Status: She is alert. Motor: No weakness.       Gait: Gait normal.   Psychiatric:         Mood and Affect: Mood normal.                PAULETTE Peterson

## 2023-07-17 ENCOUNTER — APPOINTMENT (OUTPATIENT)
Dept: LAB | Facility: CLINIC | Age: 23
End: 2023-07-17
Payer: COMMERCIAL

## 2023-07-17 DIAGNOSIS — Z11.1 TUBERCULOSIS SCREENING: ICD-10-CM

## 2023-07-17 DIAGNOSIS — Z01.84 IMMUNITY STATUS TESTING: ICD-10-CM

## 2023-07-17 DIAGNOSIS — Z91.89 RISK OF EXPOSURE TO COMMUNICABLE DISEASE: ICD-10-CM

## 2023-07-17 LAB — RUBV IGG SERPL IA-ACNC: 31.2 IU/ML

## 2023-07-17 PROCEDURE — 86735 MUMPS ANTIBODY: CPT

## 2023-07-17 PROCEDURE — 86762 RUBELLA ANTIBODY: CPT

## 2023-07-17 PROCEDURE — 36415 COLL VENOUS BLD VENIPUNCTURE: CPT

## 2023-07-17 PROCEDURE — 86480 TB TEST CELL IMMUN MEASURE: CPT

## 2023-07-17 PROCEDURE — 86765 RUBEOLA ANTIBODY: CPT

## 2023-07-17 PROCEDURE — 86787 VARICELLA-ZOSTER ANTIBODY: CPT

## 2023-07-18 LAB
GAMMA INTERFERON BACKGROUND BLD IA-ACNC: 0.09 IU/ML
M TB IFN-G BLD-IMP: NEGATIVE
M TB IFN-G CD4+ BCKGRND COR BLD-ACNC: 0 IU/ML
M TB IFN-G CD4+ BCKGRND COR BLD-ACNC: 0.01 IU/ML
MEV IGG SER QL IA: NORMAL
MITOGEN IGNF BCKGRD COR BLD-ACNC: >10 IU/ML
MUV IGG SER QL IA: NORMAL
VZV IGG SER QL IA: ABNORMAL

## 2023-08-15 DIAGNOSIS — D50.9 IRON DEFICIENCY ANEMIA, UNSPECIFIED IRON DEFICIENCY ANEMIA TYPE: ICD-10-CM

## 2023-08-15 RX ORDER — FERROUS SULFATE TAB EC 324 MG (65 MG FE EQUIVALENT) 324 (65 FE) MG
324 TABLET DELAYED RESPONSE ORAL
Qty: 180 TABLET | Refills: 3 | Status: SHIPPED | OUTPATIENT
Start: 2023-08-15 | End: 2023-11-13

## 2023-08-15 NOTE — TELEPHONE ENCOUNTER
Received fax from [facility] on [address] in [facility] requesting a refill on ferrous sulfate 324 mg takes 1 bid with meals. #180. Isaac Najera  REFILL SENT

## 2024-03-07 NOTE — PROGRESS NOTES
Detailed message left with information below.   Hematology/Oncology Outpatient Consult  Jemima Ayoub 23 y o  female 2000 [id number]    Date:  9/23/2022      Assessment and Plan:  1  Iron deficiency anemia  - Hgb has been in the 7's ever since Dec 2020   - was advised iron at one time, took but not for a consistent long time   - had repeat labs, Hgb 7 7, ferritin is 1    - occult stool negative   - Periods were heavy but have no been for 2 years while on OCP   - Dietary recall seems OK with dietary recall    - advised to take oral iron BID consistent for 2 months, then repeat labs  If still no improvement, to then consider IV iron  - Depending on results will also further discuss GI consult  Follow up in 2 months      - CBC and differential; Future  - Iron Panel (Includes Ferritin, Iron Sat%, Iron, and TIBC); Future      HPI:  23year old female presents for consult for iron deficiency anemia  She has been on OCP for 2 years  Prior to [facility], period was 6-8 days  First and second day, very heavy bleeding, cahnging standard pad 4 times in a day  On OCP she takes 3 weeks of medications, then placebo pills on hte 4th week; she has bleeding but much lighter than when not on OCP  She consistently took oral iron for some time a few years ago, took every day, and then stopped  When prescribed recently, she started to take as prescribed  She only started taking a few days ago  About a year ago, had an episode of being more tired  Drinking more water and noticed improvement in energy  Just started taking iron a few days ago     Has had 2 episodes of dizziness in the shower before  No often  ROS: Review of Systems   Constitutional: Negative for activity change, appetite change, chills, fatigue, fever and unexpected weight change  Eyes: Eye pain:      Respiratory: Negative for cough and shortness of breath  Cardiovascular: Negative for chest pain, palpitations and leg swelling     Gastrointestinal: Positive for constipation (sometimes with taking oral iron )  Negative for abdominal pain, blood in stool, diarrhea, nausea and vomiting  Denies dark or black stool    Genitourinary: Negative for difficulty urinating, dysuria and hematuria  Musculoskeletal: Negative for arthralgias  Skin: Negative  Neurological: Negative for dizziness, weakness, light-headedness, numbness and headaches  Hematological: Negative  Psychiatric/Behavioral: Negative          Past Medical History:   Diagnosis Date    Anemia        Past Surgical History:   Procedure Laterality Date    WISDOM TOOTH EXTRACTION  2017       Social History     Socioeconomic History    Marital status: Single     Spouse name: None    Number of children: None    Years of education: None    Highest education level: None   Occupational History    None   Tobacco Use    Smoking status: Never Smoker    Smokeless tobacco: Never Used   Vaping Use    Vaping Use: Never used   Substance and Sexual Activity    Alcohol use: Not Currently    Drug use: Never    Sexual activity: Yes     Partners: Male     Birth control/protection: Condom Male, OCP   Other Topics Concern    None   Social History Narrative    None     Social Determinants of Health     Financial Resource Strain: Not on file   Food Insecurity: Not on file   Transportation Needs: Not on file   Physical Activity: Not on file   Stress: Not on file   Social Connections: Not on file   Intimate Partner Violence: Not on file   Housing Stability: Not on file       Family History   Problem Relation Age of Onset    No Known Problems Mother     No Known Problems Father     Diabetes Paternal Grandmother     Substance Abuse Neg Hx     Mental illness Neg Hx     Breast cancer Neg Hx     Colon cancer Neg Hx     Ovarian cancer Neg Hx        No Known Allergies      Current Outpatient Medications:     ferrous sulfate 324 (65 Fe) mg, Take 1 tablet (324 mg total) by mouth 2 (two) times a day before meals, Disp: 60 tablet, Rfl: 2    norgestimate-ethinyl estradiol (ORTHO TRI-CYCLEN LO) 0 18/0 215/0 25 MG-25 MCG per tablet, Take 1 tablet by mouth daily, Disp: 90 tablet, Rfl: 4    mupirocin (BACTROBAN) 2 % ointment, Apply topically 2 (two) times a day for 7 days, Disp: 22 g, Rfl: 1      Physical Exam:  /64 (BP Location: Left arm, Patient Position: Sitting, Cuff Size: Standard)   Temp 98 6 °F (37 °C) (Temporal)   Ht 5' 4 29" (1 633 m)   Wt 56 7 kg (125 lb)   BMI 21 26 kg/m²     Physical Exam  Vitals reviewed  Constitutional:       General: She is not in acute distress  Appearance: She is well-developed  She is not ill-appearing  HENT:      Head: Normocephalic and atraumatic  Eyes:      General: No scleral icterus  Conjunctiva/sclera: Conjunctivae normal    Cardiovascular:      Rate and Rhythm: Normal rate and regular rhythm  Heart sounds: Normal heart sounds  No murmur heard  Pulmonary:      Effort: Pulmonary effort is normal  No respiratory distress  Breath sounds: Normal breath sounds  Chest:   Breasts:      Right: No axillary adenopathy or supraclavicular adenopathy  Left: No axillary adenopathy or supraclavicular adenopathy  Abdominal:      Palpations: Abdomen is soft  Tenderness: There is no abdominal tenderness  Musculoskeletal:         General: No tenderness  Normal range of motion  Cervical back: Normal range of motion and neck supple  Right lower leg: No edema  Left lower leg: No edema  Lymphadenopathy:      Cervical: No cervical adenopathy  Upper Body:      Right upper body: No supraclavicular or axillary adenopathy  Left upper body: No supraclavicular or axillary adenopathy  Skin:     General: Skin is warm and dry  Neurological:      Mental Status: She is alert and oriented to person, place, and time  Cranial Nerves: No cranial nerve deficit     Psychiatric:         Mood and Affect: Mood normal          Behavior: Behavior normal            Labs:  Lab Results   Component Value Date    WBC 5 44 08/07/2022    HGB 7 7 (L) 08/07/2022    HCT 27 8 (L) 08/07/2022    MCV 63 (L) 08/07/2022     (H) 08/07/2022     Lab Results   Component Value Date    K 3 7 01/16/2021     01/16/2021    CO2 27 01/16/2021    BUN 19 01/16/2021    CREATININE 0 46 (L) 01/16/2021    GLUF 75 01/16/2021    CALCIUM 9 2 01/16/2021    AST 14 01/16/2021    ALT 26 01/16/2021    ALKPHOS 63 01/16/2021    EGFR 146 01/16/2021       Patient voiced understanding and agreement in the above discussion  Aware to contact our office with questions/symptoms in the interim  This note has been generated by voice recognition software system  Therefore, there may be spelling, grammar, and or syntax errors  Please contact if questions arise

## 2024-06-17 ENCOUNTER — ESTABLISHED COMPREHENSIVE EXAM (OUTPATIENT)
Dept: URBAN - METROPOLITAN AREA CLINIC 6 | Facility: CLINIC | Age: 24
End: 2024-06-17

## 2024-06-17 DIAGNOSIS — Z01.00: ICD-10-CM

## 2024-06-17 DIAGNOSIS — H52.203: ICD-10-CM

## 2024-06-17 DIAGNOSIS — H52.13: ICD-10-CM

## 2024-06-17 PROCEDURE — S0621 ROUTINE OPHTHALMOLOGICAL EXA: HCPCS

## 2024-06-17 ASSESSMENT — TONOMETRY
OS_IOP_MMHG: 17
OD_IOP_MMHG: 16

## 2024-06-17 ASSESSMENT — VISUAL ACUITY
OS_CC: 20/20
OD_CC: 20/20

## 2024-06-24 DIAGNOSIS — Z30.41 ORAL CONTRACEPTIVE PILL SURVEILLANCE: ICD-10-CM

## 2024-06-24 DIAGNOSIS — N92.0 MENORRHAGIA WITH REGULAR CYCLE: ICD-10-CM

## 2024-06-24 RX ORDER — NORGESTIMATE AND ETHINYL ESTRADIOL
1 KIT DAILY
Qty: 84 TABLET | Refills: 0 | Status: SHIPPED | OUTPATIENT
Start: 2024-06-24

## 2024-07-12 ENCOUNTER — ANNUAL EXAM (OUTPATIENT)
Dept: OBGYN CLINIC | Facility: CLINIC | Age: 24
End: 2024-07-12

## 2024-07-12 VITALS
DIASTOLIC BLOOD PRESSURE: 68 MMHG | BODY MASS INDEX: 22.79 KG/M2 | RESPIRATION RATE: 18 BRPM | WEIGHT: 128.6 LBS | SYSTOLIC BLOOD PRESSURE: 107 MMHG | HEIGHT: 63 IN | HEART RATE: 80 BPM

## 2024-07-12 DIAGNOSIS — Z01.419 WELL WOMAN EXAM: Primary | ICD-10-CM

## 2024-07-12 PROCEDURE — G0145 SCR C/V CYTO,THINLAYER,RESCR: HCPCS

## 2024-07-12 NOTE — PROGRESS NOTES
"OB/GYN VISIT  Swathi Del Toro  2024  11:40 AM    Subjective:    Swathi Del Toro is a 24 y.o.  female who presents for annual well woman exam.      Concerns today: wants to discuss changing/ stopping BC, she will return to this office to discuss this    Review of Systems  Periods are regular every 28-30 days, lasting 4 days. No intermenstrual bleeding, spotting, or discharge.   Denies vaginal discharge, labial erythema or lesions, dyspareunia.  Sexually active: yes -  with 1 male partner  Current contraception:  OCPs , but is interested in coming off of it  History of abnormal Pap smear: no  Family history of breast, endometrial, uterine or ovarian cancer: no  History of abnormal mammogram: no  STI hx: none  GYN surgeries: none     Vaccines:  Gardasil (HPV) vaccine: yes in    COVID Vaccine: yes     Screening  Cervical Cancer Screening: First pap collected today    Health Maintenance:    She exercises 2 days per week.  She does perform breast self-awareness   She feels safe at home.   She does follow a well balanced diet.    She does not use tobacco      Menstrual History:  OB History          0    Para   0    Term   0       0    AB   0    Living   0         SAB   0    IAB   0    Ectopic   0    Multiple   0    Live Births   0                Menarche age: 14  Patient's last menstrual period was 2024 (approximate).         Past Medical History:   Diagnosis Date    Anemia        Past Surgical History:   Procedure Laterality Date    WISDOM TOOTH EXTRACTION          Objective:  /68 (BP Location: Left arm, Patient Position: Sitting, Cuff Size: Adult)   Pulse 80   Resp 18   Ht 5' 3\" (1.6 m)   Wt 58.3 kg (128 lb 9.6 oz)   LMP 2024 (Approximate)   BMI 22.78 kg/m²  Body mass index is 22.78 kg/m².     Physical Exam:  GEN: The patient was alert and oriented x3, pleasant well-appearing female in no acute distress.   HEENT:  Unremarkable  CV:  Regular rate   RESP:  " Unlabored breathing  BREAST:  Symmetric breasts with no palpable breast masses or obvious breast lesions. She has no retractions or nipple discharge. She has no axillary abnormalities or palpable masses.   GI:  Soft, nontender, non-distended  MSK: bilateral lower extremities are nontender, no edema  : Normal appearing external female genitalia, normal appearing urethral meatus. On sterile speculum exam,  normal appearing vaginal epithelium, no vaginal discharge, no bleeding, grossly normal appearing cervix. On bimanual exam,  no cervical motion tenderness; uterus is smooth, mobile, nontender No tenderness or fullness in the bilateral adnexa.        ASSESSMENT/PLAN: Swathi Del Toro is a 24 y.o.  who presents for annual gynecologic exam.      1.  Routine well woman exam done today.  2.  Pap and HPV:Pap with reflex to HPV if abnormal was done today.  Current ASCCP Guidelines reviewed.   3.  The patient declined STD testing.   Safe sex practices have been discussed.  4. The patient is sexually active. On OCPs  5. The following were reviewed in today's visit: breast self exam, use and side effects of OCPs, exercise, and healthy diet.  6. Patient to return to office to discuss BC              D/w Dr. Dhruv Perry MD  OBGYN PGY-3  2024 12:24 PM

## 2024-07-22 LAB
LAB AP GYN PRIMARY INTERPRETATION: NORMAL
Lab: NORMAL

## 2024-07-30 ENCOUNTER — APPOINTMENT (OUTPATIENT)
Dept: RADIOLOGY | Age: 24
End: 2024-07-30
Payer: COMMERCIAL

## 2024-07-30 ENCOUNTER — OFFICE VISIT (OUTPATIENT)
Dept: INTERNAL MEDICINE CLINIC | Facility: CLINIC | Age: 24
End: 2024-07-30
Payer: COMMERCIAL

## 2024-07-30 VITALS
SYSTOLIC BLOOD PRESSURE: 116 MMHG | BODY MASS INDEX: 23.04 KG/M2 | HEIGHT: 63 IN | OXYGEN SATURATION: 98 % | DIASTOLIC BLOOD PRESSURE: 60 MMHG | HEART RATE: 85 BPM | WEIGHT: 130 LBS

## 2024-07-30 DIAGNOSIS — R05.1 ACUTE COUGH: Primary | ICD-10-CM

## 2024-07-30 DIAGNOSIS — R05.1 ACUTE COUGH: ICD-10-CM

## 2024-07-30 PROCEDURE — 99203 OFFICE O/P NEW LOW 30 MIN: CPT | Performed by: INTERNAL MEDICINE

## 2024-07-30 PROCEDURE — 71046 X-RAY EXAM CHEST 2 VIEWS: CPT

## 2024-07-30 PROCEDURE — 3725F SCREEN DEPRESSION PERFORMED: CPT | Performed by: INTERNAL MEDICINE

## 2024-07-30 RX ORDER — AZITHROMYCIN 250 MG/1
TABLET, FILM COATED ORAL
Qty: 6 TABLET | Refills: 0 | Status: SHIPPED | OUTPATIENT
Start: 2024-07-30 | End: 2024-08-04

## 2024-07-30 NOTE — PROGRESS NOTES
Ambulatory Visit  Name: Swathi Del Toro      : 2000      MRN: [id number]  Encounter Provider: Stefan Sims MD  Encounter Date: 2024   Encounter department: [facility]    Assessment & Plan   1. Acute cough  Assessment & Plan:  Discussed possibility of post bronchitic cough, will cover with azithromycin for the possibility of atypical bronchitis.  Also discussed getting chest x-ray, and patient wishes to get chest x-ray at this time.  Orders:  -     XR chest pa & lateral; Future; Expected date: 2024  -     azithromycin (Zithromax) 250 mg tablet; Take 2 tablets (500 mg total) by mouth daily for 1 day, THEN 1 tablet (250 mg total) daily for 4 days.         History of Present Illness     Pt here to establish care and has a concern.  Pt was sick in  with cold symptoms and coughing.  The cough has persisted.  No fevers, no chills, no flulike aches, no head congestion, no earache, no sore throat.  Patient does cough up some green mucus occasionally.  No pleuritic pain or shortness of breath.  Patient denies allergy symptoms like itchy eyes or runny nose      Review of Systems   Constitutional:  Negative for chills and fever.   HENT:  Negative for congestion and sore throat.    Respiratory:  Positive for cough. Negative for shortness of breath.      Past Medical History:   Diagnosis Date   • Anemia      Past Surgical History:   Procedure Laterality Date   • WISDOM TOOTH EXTRACTION  2017     Family History   Problem Relation Age of Onset   • No Known Problems Mother    • No Known Problems Father    • Diabetes Paternal Grandmother    • Stroke Paternal Grandmother    • Substance Abuse Neg Hx    • Mental illness Neg Hx    • Breast cancer Neg Hx    • Colon cancer Neg Hx    • Ovarian cancer Neg Hx      Social History     Tobacco Use   • Smoking status: Never   • Smokeless tobacco: Never   Vaping Use   • Vaping status: Never Used   Substance and Sexual Activity   • Alcohol use: Not  "Currently   • Drug use: Never   • Sexual activity: Yes     Partners: Male     Birth control/protection: Condom Male, OCP     Current Outpatient Medications on File Prior to Visit   Medication Sig   • norgestimate-ethinyl estradiol (Tri-[place]) 0.18/0.215/0.25 MG-25 MCG per tablet TAKE 1 TABLET BY MOUTH EVERY DAY   • ferrous sulfate 324 (65 Fe) mg Take 1 tablet (324 mg total) by mouth 2 (two) times a day before meals   • mupirocin (BACTROBAN) 2 % ointment Apply topically 2 (two) times a day for 7 days     No Known Allergies  Immunization History   Administered Date(s) Administered   • COVID-19 PFIZER VACCINE 0.3 ML IM 04/19/2021, 05/12/2021, 12/11/2021   • DTaP / HiB 2002, 2002   • DTaP 5 01/06/2003, 01/14/2004, 06/26/2007   • H1N1, All Formulations 10/27/2009, 12/09/2009   • HPV Quadrivalent 08/20/2014, 10/22/2014   • HPV9 08/13/2015   • Hep A, adult 08/16/2010, 08/08/2011   • Hep B, adult 2002, 2002, 04/07/2003   • Hib (PRP-OMP) 01/06/2003, 10/14/2003   • INFLUENZA 10/27/2009, 11/03/2011   • IPV 2002, 2002, 01/14/2004, 06/26/2007   • Influenza Quadrivalent Preservative Free 3 years and older IM 09/08/2023   • Influenza, seasonal, injectable 10/14/2003   • MMR 10/14/2003, 06/26/2006   • Meningococcal B, Recombinant (TRUMENBA) 10/09/2019, 01/13/2021   • Meningococcal MCV4, Unspecified 09/19/2018   • Meningococcal MCV4P 09/19/2018   • Meningococcal, Unknown Serogroups 08/13/2013   • Pneumococcal Conjugate PCV 7 2002, 2002, 07/14/2003, 07/08/2004   • Tdap 08/13/2013, 08/23/2023   • Tuberculin Skin Test-PPD Intradermal 07/14/2003, 06/26/2007, 03/30/2017, 04/06/2017   • Varicella 07/14/2003, 06/26/2007, 08/23/2023     Objective     /60   Pulse 85   Ht 5' 3\" (1.6 m)   Wt 59 kg (130 lb)   LMP 06/28/2024 (Approximate)   SpO2 98%   BMI 23.03 kg/m²     Physical Exam  Constitutional:       Appearance: Normal appearance.   HENT:      Mouth/Throat:      Mouth: Mucous " membranes are moist.      Pharynx: No oropharyngeal exudate or posterior oropharyngeal erythema.   Cardiovascular:      Rate and Rhythm: Normal rate and regular rhythm.      Heart sounds: Normal heart sounds. No murmur heard.  Pulmonary:      Effort: Pulmonary effort is normal. No respiratory distress.      Breath sounds: Normal breath sounds. No stridor. No wheezing, rhonchi or rales.   Neurological:      Mental Status: She is alert.

## 2024-07-30 NOTE — PATIENT INSTRUCTIONS
Problem List Items Addressed This Visit          Other    Acute cough - Primary     Discussed possibility of post bronchitic cough, will cover with azithromycin for the possibility of atypical bronchitis.  Also discussed getting chest x-ray, and patient wishes to get chest x-ray at this time.         Relevant Medications    azithromycin (Zithromax) 250 mg tablet    Other Relevant Orders    XR chest pa & lateral

## 2024-07-30 NOTE — ASSESSMENT & PLAN NOTE
Discussed possibility of post bronchitic cough, will cover with azithromycin for the possibility of atypical bronchitis.  Also discussed getting chest x-ray, and patient wishes to get chest x-ray at this time.

## 2024-08-20 ENCOUNTER — OFFICE VISIT (OUTPATIENT)
Dept: INTERNAL MEDICINE CLINIC | Facility: CLINIC | Age: 24
End: 2024-08-20
Payer: COMMERCIAL

## 2024-08-20 VITALS
HEART RATE: 78 BPM | WEIGHT: 130.8 LBS | BODY MASS INDEX: 23.17 KG/M2 | SYSTOLIC BLOOD PRESSURE: 128 MMHG | DIASTOLIC BLOOD PRESSURE: 70 MMHG | OXYGEN SATURATION: 99 %

## 2024-08-20 DIAGNOSIS — R05.1 ACUTE COUGH: Primary | ICD-10-CM

## 2024-08-20 PROCEDURE — 99214 OFFICE O/P EST MOD 30 MIN: CPT | Performed by: INTERNAL MEDICINE

## 2024-08-20 NOTE — ASSESSMENT & PLAN NOTE
Improving, discussed the possibility of bronchitis.  Patient was treated with azithromycin for the possibility of atypical bronchitis.  Also had a chest x-ray done which was normal.  Pulmonary exam today normal, and patient reports she feels she is improving.  I recommend evaluation with pulmonary if cough persisting.  Pulse ox 99%.

## 2024-08-20 NOTE — PROGRESS NOTES
Ambulatory Visit  Name: Swathi Del Toro      : 2000      MRN: [id number]  Encounter Provider: Stefan Sims MD  Encounter Date: 2024   Encounter department: [facility]    Assessment & Plan   1. Acute cough  Assessment & Plan:  Improving, discussed the possibility of bronchitis.  Patient was treated with azithromycin for the possibility of atypical bronchitis.  Also had a chest x-ray done which was normal.  Pulmonary exam today normal, and patient reports she feels she is improving.  I recommend evaluation with pulmonary if cough persisting.  Pulse ox 99%.         History of Present Illness     Pt here for problem with cough.  She was seen  for cough, was treated with azithromycin, was having green mucus.  X-ray was done which was okay.  Patient reports the cough has been persisting, but is starting to improve at this point, no pain when she breathes in on either side, no blood in the mucus, mucus production has been improving, no fevers or chills, no shortness of breath, no heartburn.  No facial pain or pressure, no congestion.  Patient denies any irritant exposure    Cough  Pertinent negatives include no chest pain, chills, fever, sore throat or shortness of breath.     Review of Systems   Constitutional:  Negative for chills and fever.   HENT:  Negative for congestion and sore throat.    Respiratory:  Positive for cough. Negative for chest tightness and shortness of breath.    Cardiovascular:  Negative for chest pain.     Past Medical History:   Diagnosis Date   • Anemia      Past Surgical History:   Procedure Laterality Date   • WISDOM TOOTH EXTRACTION  2017     Family History   Problem Relation Age of Onset   • No Known Problems Mother    • No Known Problems Father    • Diabetes Paternal Grandmother    • Stroke Paternal Grandmother    • Substance Abuse Neg Hx    • Mental illness Neg Hx    • Breast cancer Neg Hx    • Colon cancer Neg Hx    • Ovarian cancer Neg Hx       Social History     Tobacco Use   • Smoking status: Never   • Smokeless tobacco: Never   Vaping Use   • Vaping status: Never Used   Substance and Sexual Activity   • Alcohol use: Not Currently   • Drug use: Never   • Sexual activity: Yes     Partners: Male     Birth control/protection: Condom Male, OCP     Current Outpatient Medications on File Prior to Visit   Medication Sig   • norgestimate-ethinyl estradiol (Tri-[place]) 0.18/0.215/0.25 MG-25 MCG per tablet TAKE 1 TABLET BY MOUTH EVERY DAY   • ferrous sulfate 324 (65 Fe) mg Take 1 tablet (324 mg total) by mouth 2 (two) times a day before meals   • mupirocin (BACTROBAN) 2 % ointment Apply topically 2 (two) times a day for 7 days     No Known Allergies  Immunization History   Administered Date(s) Administered   • COVID-19 PFIZER VACCINE 0.3 ML IM 04/19/2021, 05/12/2021, 12/11/2021   • DTaP / HiB 2002, 2002   • DTaP 5 01/06/2003, 01/14/2004, 06/26/2007   • H1N1, All Formulations 10/27/2009, 12/09/2009   • HPV Quadrivalent 08/20/2014, 10/22/2014   • HPV9 08/13/2015   • Hep A, adult 08/16/2010, 08/08/2011   • Hep B, adult 2002, 2002, 04/07/2003   • Hib (PRP-OMP) 01/06/2003, 10/14/2003   • INFLUENZA 10/27/2009, 11/03/2011   • IPV 2002, 2002, 01/14/2004, 06/26/2007   • Influenza Quadrivalent Preservative Free 3 years and older IM 09/08/2023   • Influenza, seasonal, injectable 10/14/2003   • MMR 10/14/2003, 06/26/2006   • Meningococcal B, Recombinant (TRUMENBA) 10/09/2019, 01/13/2021   • Meningococcal MCV4, Unspecified 09/19/2018   • Meningococcal MCV4P 09/19/2018   • Meningococcal, Unknown Serogroups 08/13/2013   • Pneumococcal Conjugate PCV 7 2002, 2002, 07/14/2003, 07/08/2004   • Tdap 08/13/2013, 08/23/2023   • Tuberculin Skin Test-PPD Intradermal 07/14/2003, 06/26/2007, 03/30/2017, 04/06/2017   • Varicella 07/14/2003, 06/26/2007, 08/23/2023     Objective     /70 (BP Location: Left arm, Patient Position:  Sitting, Cuff Size: Standard)   Pulse 78   Wt 59.3 kg (130 lb 12.8 oz)   SpO2 99%   BMI 23.17 kg/m²     Physical Exam  Constitutional:       Appearance: Normal appearance.   Pulmonary:      Effort: Pulmonary effort is normal. No respiratory distress.      Breath sounds: Normal breath sounds. No stridor. No wheezing, rhonchi or rales.   Neurological:      Mental Status: She is alert.

## 2024-08-20 NOTE — PATIENT INSTRUCTIONS
Problem List Items Addressed This Visit          Other    Acute cough - Primary     Improving, discussed the possibility of bronchitis.  Patient was treated with azithromycin for the possibility of atypical bronchitis.  Also had a chest x-ray done which was normal.  Pulmonary exam today normal, and patient reports she feels she is improving.  I recommend evaluation with pulmonary if cough persisting.  Pulse ox 99%.

## 2024-08-29 PROBLEM — R05.1 ACUTE COUGH: Status: RESOLVED | Noted: 2024-07-30 | Resolved: 2024-08-29

## 2024-09-22 DIAGNOSIS — N92.0 MENORRHAGIA WITH REGULAR CYCLE: ICD-10-CM

## 2024-09-22 DIAGNOSIS — Z30.41 ORAL CONTRACEPTIVE PILL SURVEILLANCE: ICD-10-CM

## 2024-09-23 RX ORDER — NORGESTIMATE AND ETHINYL ESTRADIOL
1 KIT DAILY
Qty: 84 TABLET | Refills: 3 | Status: SHIPPED | OUTPATIENT
Start: 2024-09-23

## 2024-12-30 ENCOUNTER — APPOINTMENT (EMERGENCY)
Dept: RADIOLOGY | Facility: HOSPITAL | Age: 24
End: 2024-12-30
Payer: COMMERCIAL

## 2024-12-30 ENCOUNTER — HOSPITAL ENCOUNTER (EMERGENCY)
Facility: HOSPITAL | Age: 24
Discharge: HOME | End: 2024-12-30
Attending: EMERGENCY MEDICINE
Payer: COMMERCIAL

## 2024-12-30 VITALS
HEART RATE: 88 BPM | DIASTOLIC BLOOD PRESSURE: 68 MMHG | RESPIRATION RATE: 20 BRPM | SYSTOLIC BLOOD PRESSURE: 142 MMHG | OXYGEN SATURATION: 98 % | TEMPERATURE: 97.5 F

## 2024-12-30 DIAGNOSIS — S06.0X0A CONCUSSION WITHOUT LOSS OF CONSCIOUSNESS, INITIAL ENCOUNTER: Primary | ICD-10-CM

## 2024-12-30 PROCEDURE — 70450 CT HEAD/BRAIN W/O DYE: CPT

## 2024-12-30 PROCEDURE — 99284 EMERGENCY DEPT VISIT MOD MDM: CPT

## 2024-12-30 PROCEDURE — 99284 EMERGENCY DEPT VISIT MOD MDM: CPT | Performed by: EMERGENCY MEDICINE

## 2024-12-30 RX ORDER — IBUPROFEN 600 MG/1
600 TABLET, FILM COATED ORAL ONCE
Status: DISCONTINUED | OUTPATIENT
Start: 2024-12-30 | End: 2024-12-30 | Stop reason: HOSPADM

## 2024-12-30 RX ORDER — ACETAMINOPHEN 325 MG/1
650 TABLET ORAL ONCE
Status: COMPLETED | OUTPATIENT
Start: 2024-12-30 | End: 2024-12-30

## 2024-12-30 RX ADMIN — ACETAMINOPHEN 650 MG: 325 TABLET, FILM COATED ORAL at 12:52

## 2024-12-30 NOTE — Clinical Note
Swathi Del Toro was seen and treated in our emergency department on 12/30/2024.                Diagnosis:     Swathi  .    She may return on this date: 01/06/2025         If you have any questions or concerns, please don't hesitate to call.      Edmund Garcia, DO    ______________________________           _______________          _______________  Hospital Representative                              Date                                Time

## 2025-01-01 NOTE — ED PROVIDER NOTES
Time reflects when diagnosis was documented in both MDM as applicable and the Disposition within this note       Time User Action Codes Description Comment    12/30/2024 12:55 PM Edmund Garcia Add [S06.0X0A] Concussion without loss of consciousness, initial encounter           ED Disposition       ED Disposition   Discharge    Condition   Stable    Date/Time   Mon Dec 30, 2024 12:55 PM    Comment   Swathi Del Toro discharge to home/self care.                   Assessment & Plan       Medical Decision Making  This is a 24-year-old female presents emergency department noted fall no loss of consciousness some lightheadedness and dizziness after the fall, diagnosed at this point in time of concussion, less likely intracranial hemorrhage, contusion,    Patient sent into the emergency department for CAT scan at this point in time will perform CAT scan for evaluation of head trauma.    CT scan unremarkable, plan on outpatient management and concussion follow-up.    Amount and/or Complexity of Data Reviewed  Radiology: ordered.    Risk  OTC drugs.             Medications   acetaminophen (TYLENOL) tablet 650 mg (650 mg Oral Given 12/30/24 1252)       ED Risk Strat Scores                          SBIRT 22yo+      Flowsheet Row Most Recent Value   Initial Alcohol Screen: US AUDIT-C     1. How often do you have a drink containing alcohol? 0 Filed at: 12/30/2024 1126   2. How many drinks containing alcohol do you have on a typical day you are drinking?  0 Filed at: 12/30/2024 1126   3b. FEMALE Any Age, or MALE 65+: How often do you have 4 or more drinks on one occassion? 0 Filed at: 12/30/2024 1126   Audit-C Score 0 Filed at: 12/30/2024 1126   DANYEL: How many times in the past year have you...    Used an illegal drug or used a prescription medication for non-medical reasons? Never Filed at: 12/30/2024 1126                            History of Present Illness       Chief Complaint   Patient presents with    Head Injury      Pt fell onto black ice on Saturday and hit the back of her head. -thinners, -LOC. +lightheaded        Past Medical History:   Diagnosis Date    Anemia       Past Surgical History:   Procedure Laterality Date    WISDOM TOOTH EXTRACTION  2017      Family History   Problem Relation Age of Onset    No Known Problems Mother     No Known Problems Father     Diabetes Paternal Grandmother     Stroke Paternal Grandmother     Substance Abuse Neg Hx     Mental illness Neg Hx     Breast cancer Neg Hx     Colon cancer Neg Hx     Ovarian cancer Neg Hx       Social History     Tobacco Use    Smoking status: Never    Smokeless tobacco: Never   Vaping Use    Vaping status: Never Used   Substance Use Topics    Alcohol use: Not Currently    Drug use: Never      E-Cigarette/Vaping    E-Cigarette Use Never User       E-Cigarette/Vaping Substances    Nicotine No     THC No     CBD No     Flavoring No     Other No     Unknown No       I have reviewed and agree with the history as documented.     24-year-old female presents emerged part with noted headache, had a fall while intoxicated couple days ago, no loss of consciousness has been complaining of lightheadedness, hit the back of her head at that point in time, noted headaches and dizziness, no nausea no vomiting.      History provided by:  Patient      Review of Systems   Constitutional:  Negative for chills and fever.   HENT:  Negative for ear pain and sore throat.    Eyes:  Negative for pain and visual disturbance.   Respiratory:  Negative for cough and shortness of breath.    Cardiovascular:  Negative for chest pain and palpitations.   Gastrointestinal:  Negative for abdominal pain and vomiting.   Genitourinary:  Negative for dysuria and hematuria.   Musculoskeletal:  Negative for arthralgias and back pain.   Skin:  Negative for color change and rash.   Neurological:  Positive for dizziness and headaches. Negative for seizures and syncope.   All other systems reviewed and are  negative.          Objective       ED Triage Vitals [12/30/24 1040]   Temperature Pulse Blood Pressure Respirations SpO2 Patient Position - Orthostatic VS   97.5 °F (36.4 °C) 88 142/68 20 98 % --      Temp Source Heart Rate Source BP Location FiO2 (%) Pain Score    Temporal Monitor Left arm -- No Pain      Vitals      Date and Time Temp Pulse SpO2 Resp BP Pain Score FACES Pain Rating User   12/30/24 1252 -- -- -- -- -- No Pain -- [place]   12/30/24 1040 97.5 °F (36.4 °C) 88 98 % 20 142/68 No Pain -- [place]            Physical Exam  Vitals and nursing note reviewed.   Constitutional:       Appearance: She is well-developed.   HENT:      Head: Normocephalic and atraumatic.      Mouth/Throat:      Pharynx: No oropharyngeal exudate.   Eyes:      Pupils: Pupils are equal, round, and reactive to light.   Neck:      Trachea: No tracheal deviation.   Cardiovascular:      Rate and Rhythm: Normal rate.      Heart sounds: Normal heart sounds. No murmur heard.     No friction rub. No gallop.   Pulmonary:      Effort: Pulmonary effort is normal. No respiratory distress.      Breath sounds: No wheezing or rales.   Abdominal:      General: There is no distension.      Palpations: Abdomen is soft.      Tenderness: There is no abdominal tenderness. There is no guarding or rebound.   Musculoskeletal:         General: No tenderness. Normal range of motion.      Cervical back: Normal range of motion and neck supple.   Skin:     General: Skin is warm.      Findings: No rash.   Neurological:      Mental Status: She is alert and oriented to person, place, and time.         Results Reviewed       None            CT head without contrast   Final Interpretation by Bear Hawkins MD (12/30 1242)      No acute intracranial abnormality.                  Workstation performed: [id number]             Procedures    ED Medication and Procedure Management   Prior to Admission Medications   Prescriptions Last Dose Informant Patient Reported? Taking?    ferrous sulfate 324 (65 Fe) mg   No No   Sig: Take 1 tablet (324 mg total) by mouth 2 (two) times a day before meals   mupirocin (BACTROBAN) 2 % ointment   No No   Sig: Apply topically 2 (two) times a day for 7 days   norgestimate-ethinyl estradiol (Tri-Lo-Luz [place]) 0.18/0.215/0.25 MG-25 MCG per tablet   No No   Sig: TAKE 1 TABLET BY MOUTH EVERY DAY      Facility-Administered Medications: None     Discharge Medication List as of 12/30/2024 12:55 PM        CONTINUE these medications which have NOT CHANGED    Details   ferrous sulfate 324 (65 Fe) mg Take 1 tablet (324 mg total) by mouth 2 (two) times a day before meals, Starting Tue 8/15/2023, Until Mon 11/13/2023, Normal      mupirocin (BACTROBAN) 2 % ointment Apply topically 2 (two) times a day for 7 days, Starting Thu 10/8/2020, Until Thu 10/15/2020, Normal      norgestimate-ethinyl estradiol (Tri-Lo-Luz Marina) 0.18/0.215/0.25 MG-25 MCG per tablet TAKE 1 TABLET BY MOUTH EVERY DAY, Starting Mon 9/23/2024, Normal             ED SEPSIS DOCUMENTATION   Time reflects when diagnosis was documented in both MDM as applicable and the Disposition within this note       Time User Action Codes Description Comment    12/30/2024 12:55 PM Edmund Gracia [S06.0X0A] Concussion without loss of consciousness, initial encounter                  Edmund Garcia DO  01/01/25 0808

## 2025-01-09 ENCOUNTER — OFFICE VISIT (OUTPATIENT)
Dept: OBGYN CLINIC | Facility: CLINIC | Age: 25
End: 2025-01-09
Payer: COMMERCIAL

## 2025-01-09 VITALS — HEIGHT: 63 IN | BODY MASS INDEX: 23.04 KG/M2 | WEIGHT: 130 LBS

## 2025-01-09 DIAGNOSIS — S06.0X0A CONCUSSION WITHOUT LOSS OF CONSCIOUSNESS, INITIAL ENCOUNTER: ICD-10-CM

## 2025-01-09 PROCEDURE — 99204 OFFICE O/P NEW MOD 45 MIN: CPT | Performed by: ORTHOPAEDIC SURGERY

## 2025-01-09 NOTE — LETTER
January 9, 2025     Patient: Swathi Del Toro  YOB: 2000  Date of Visit: 1/9/2025      To Whom it May Concern:    Swathi Del Toro is under my professional care. Swathi was seen in my office on 1/9/2025. Swathi is cleared to work at this time.  Please excuse her from her work from 12/28/2024 through 1/5/2025.    If you have any questions or concerns, please don't hesitate to call.         Sincerely,          Toi Bowles, DO

## 2025-01-09 NOTE — PROGRESS NOTES
Assessment/Plan:  1. Concussion without loss of consciousness, initial encounter  Ambulatory Referral to Comprehensive Concussion Program          Swathi has a history and symptoms consistent with concussion today. She was counseled to avoid any activities that may worsen their symptoms such as watching TV, cell phones, loud music or anything that gives him a headache. She may take Tylenol for headaches and was advised to avoid anti-inflammatories. We did discuss natural supplements that may help with headaches such as fish oil today. We did advise to get appropriate sleep, maintain good nutrition and continue to stay hydrated.  Her symptoms should resolve in the next 1 to 2 weeks.  At this point her symptoms are minor and I do think it is safe for her to return to work as tolerated.  Follow-up with me in 2 to 3 weeks if symptoms do not resolve by then.              Patient ID: Swathi Del Toro is a 24 y.o. female presents to the office following a head injury.  She had a slip and fall on ice on 12/28/2024 hitting her head.  She did not lose consciousness.  She went to urgent care and then was sent to the emergency room.  She had a CT scan which was negative.  She has been out of work due to her increased symptoms.  She works as a nurse.  The emergency room wrote for her to go back to work this week and she went to work on Monday of this week.  This did not significantly exacerbate her symptoms.  She tolerated it well.  She states today she has a little bit of a headache and feeling lightheaded but does not feel significantly worse.  She has never had a concussion before.    Injury Description:  Date / Time: 12/28/2024  Injury Description: Slip and fall on ice hitting back of head  Location:  Occipital  Cause:  Fall  LOC:  no  Amnesia:   Retrograde:  no   Anterograde:  no   Seizures:  No  ER Visit: Yes  CT Scan:  Yes, negative    Concussion Risk Factors:  History of Concussion: No  History of Migraines: No  Family  History of Headache:  No  Developmental History:  none  Psychiatric History:  none  History of Sleep Disorder:  No  Do symptoms worsen with Physical Activity?  N/A  Do symptoms worsen with Cognitive Activity?  Yes  What percent is this person back to normal?  Patient 60 %    Symptoms Checklist      Flowsheet Row Most Recent Value   Physical    Headache 1   Nausea 0   Vomiting 0   Balance problems 1   Dizziness 1   Visual problems 0   Fatigue 0   Sensitivity to light 1   Sensitivity to noise 1   Numbness / tingling 0   TOTAL PHYSICAL SCORE 5   Cognitive    Foggy 0   Slowed down 0   Difficulty concentrating 0   Difficulty remembering 0   TOTAL COGNITIVE SCORE 0   Emotional    Irritability 0   Sadness 0   More emotional 0   Nervousness 0   TOTAL EMOTIONAL SCORE 0   Sleep    Drowsiness 1   Sleeping less 0   Sleeping more 0   Difficulty falling asleep 0   TOTAL SLEEP SCORE 1   TOTAL SYMPTOM SCORE 6            Review of Systems  Past Medical History:   Diagnosis Date    Anemia        Past Surgical History:   Procedure Laterality Date    WISDOM TOOTH EXTRACTION  2017       Family History   Problem Relation Age of Onset    No Known Problems Mother     No Known Problems Father     Diabetes Paternal Grandmother     Stroke Paternal Grandmother     Substance Abuse Neg Hx     Mental illness Neg Hx     Breast cancer Neg Hx     Colon cancer Neg Hx     Ovarian cancer Neg Hx        Social History     Occupational History    Not on file   Tobacco Use    Smoking status: Never    Smokeless tobacco: Never   Vaping Use    Vaping status: Never Used   Substance and Sexual Activity    Alcohol use: Not Currently    Drug use: Never    Sexual activity: Yes     Partners: Male     Birth control/protection: Condom Male, OCP         Current Outpatient Medications:     ferrous sulfate 324 (65 Fe) mg, Take 1 tablet (324 mg total) by mouth 2 (two) times a day before meals, Disp: 180 tablet, Rfl: 3    mupirocin (BACTROBAN) 2 % ointment, Apply  topically 2 (two) times a day for 7 days, Disp: 22 g, Rfl: 1    norgestimate-ethinyl estradiol (Tri-Lo-Luz Marina) 0.18/0.215/0.25 MG-25 MCG per tablet, TAKE 1 TABLET BY MOUTH EVERY DAY, Disp: 84 tablet, Rfl: 3    No Known Allergies    Objective:  There were no vitals filed for this visit.    Ortho Exam    Physical Exam    General:   NAD:  Yes  Psych:   AAOX3:  Yes   Mood and Affect:  Normal  HEENT:   Lacerations:  No   Bruising:  No   PEERLA:  Yes   EOMI: Yes   Fracture/Trauma:  No    Vestibular Ocular:     Gaze stability:    Nystagmus: no    Saccades: no/horizontal/vertical: normal    Vestibular Motion: headache with horizontal movement and headache with vertical movement    Convergence:  8cm  Neuro:   CNII - XII Intact:  Yes   Examination of Coordination:    Limited Balance:   No    Romberg:  normal    Forward Tandem Gait:  normal    Backward Tandem Gait:   normal    Eyes Close Tandem Gait:   normal        FTN: normal    Diadochokinesia: normal            This document was created using speech voice recognition software.   Grammatical errors, random word insertions, pronoun errors, and incomplete sentences are an occasional consequence of this system due to software limitations, ambient noise, and hardware issues.   Any formal questions or concerns about content, text, or information contained within the body of this dictation should be directly addressed to the provider for clarification.

## 2025-01-24 ENCOUNTER — OFFICE VISIT (OUTPATIENT)
Dept: INTERNAL MEDICINE CLINIC | Facility: CLINIC | Age: 25
End: 2025-01-24
Payer: COMMERCIAL

## 2025-01-24 VITALS
BODY MASS INDEX: 23.03 KG/M2 | SYSTOLIC BLOOD PRESSURE: 110 MMHG | WEIGHT: 130 LBS | DIASTOLIC BLOOD PRESSURE: 80 MMHG | OXYGEN SATURATION: 99 % | HEART RATE: 101 BPM

## 2025-01-24 DIAGNOSIS — Z13.1 SCREENING FOR DIABETES MELLITUS: ICD-10-CM

## 2025-01-24 DIAGNOSIS — Z11.3 SCREENING FOR STDS (SEXUALLY TRANSMITTED DISEASES): ICD-10-CM

## 2025-01-24 DIAGNOSIS — D50.9 IRON DEFICIENCY ANEMIA, UNSPECIFIED IRON DEFICIENCY ANEMIA TYPE: Primary | ICD-10-CM

## 2025-01-24 DIAGNOSIS — Z00.00 WELLNESS EXAMINATION: ICD-10-CM

## 2025-01-24 DIAGNOSIS — Z13.6 SCREENING FOR CARDIOVASCULAR CONDITION: ICD-10-CM

## 2025-01-24 PROCEDURE — 99395 PREV VISIT EST AGE 18-39: CPT | Performed by: INTERNAL MEDICINE

## 2025-01-24 NOTE — ASSESSMENT & PLAN NOTE
Orders:  •  Iron Panel (Includes Ferritin, Iron Sat%, Iron, and TIBC); Future  •  Ambulatory Referral to Gastroenterology; Future

## 2025-01-24 NOTE — PROGRESS NOTES
Adult Annual Physical  Name: Swathi Del Toro      : 2000      MRN: [id number]  Encounter Provider: Quinten Ding DO  Encounter Date: 2025   Enc[facility] department: [facility]    Assessment & Plan  Screening for STDs (sexually transmitted diseases)    Orders:  •  Chlamydia/GC amplified DNA by PCR; Future    Iron deficiency anemia, unspecified iron deficiency anemia type    Orders:  •  Iron Panel (Includes Ferritin, Iron Sat%, Iron, and TIBC); Future  •  Ambulatory Referral to Gastroenterology; Future    Screening for cardiovascular condition    Orders:  •  Lipid Panel with Direct LDL reflex; Future    Screening for diabetes mellitus    Orders:  •  Comprehensive metabolic panel; Future  •  Hemoglobin A1C; Future    Wellness examination  Assessment and plan 1.  Health maintenance annual wellness examination overall the patient is clinically stable and doing well, we encouraged the patient to follow a healthy and balanced diet.  We recommend that the patient exercise routinely approximately 30 minutes 5 times per week .  We have reviewed the patient's vaccines and have made recommendations for updates if necessary      .  We will be ordering screening laboratories which are age appropriate.  Return to the office in   1 year   call if any problems.       Immunizations and preventive care screenings were discussed with patient today. Appropriate education was printed on patient's after visit summary.  Fall in dec concussion went er  Counseling:  Exercise: the importance of regular exercise/physical activity was discussed. Recommend exercise 3-5 times per week for at least 30 minutes.          History of Present Illness     Adult Annual Physical:  Patient presents for annual physical.     Diet and Physical Activity:  - Diet/Nutrition: well balanced diet. mom cooks , vegetables , rice , fruits  - Exercise: no formal exercise. considering    Depression Screening:  - PHQ-2 Score:  0    General Health:  - Sleep: 7-8 hours of sleep on average.  - Hearing: normal hearing right ear and normal hearing left ear.  - Vision: no vision problems and goes for regular eye exams. reading and driving glasses    /GYN Health:    - Menopause: premenopausal.   - Contraception: oral contraceptives.      Review of Systems   Constitutional:  Negative for activity change, appetite change and unexpected weight change.   HENT:  Negative for congestion and postnasal drip.    Eyes:  Negative for visual disturbance.   Respiratory:  Negative for cough and shortness of breath.    Cardiovascular:  Negative for chest pain.   Gastrointestinal:  Negative for abdominal pain, diarrhea, nausea and vomiting.   Neurological:  Negative for dizziness, light-headedness and headaches.   Hematological:  Negative for adenopathy.     Social History     Tobacco Use   • Smoking status: Never   • Smokeless tobacco: Never   Vaping Use   • Vaping status: Never Used   Substance and Sexual Activity   • Alcohol use: Not Currently   • Drug use: Never   • Sexual activity: Yes     Partners: Male     Birth control/protection: Condom Male, OCP       Objective   /80   Pulse 101   Wt 59 kg (130 lb)   SpO2 99%   BMI 23.03 kg/m²     Physical Exam  Constitutional:       Appearance: She is well-developed.   HENT:      Head: Normocephalic and atraumatic.      Right Ear: External ear normal.      Left Ear: External ear normal.      Nose: Nose normal.   Eyes:      Pupils: Pupils are equal, round, and reactive to light.   Cardiovascular:      Rate and Rhythm: Normal rate and regular rhythm.      Heart sounds: Normal heart sounds. No murmur heard.  Pulmonary:      Effort: Pulmonary effort is normal.      Breath sounds: Normal breath sounds.   Abdominal:      General: There is no distension.      Palpations: Abdomen is soft.      Tenderness: There is no abdominal tenderness. There is no guarding.

## 2025-01-27 ENCOUNTER — APPOINTMENT (OUTPATIENT)
Dept: LAB | Facility: CLINIC | Age: 25
End: 2025-01-27
Payer: COMMERCIAL

## 2025-01-27 ENCOUNTER — TELEPHONE (OUTPATIENT)
Age: 25
End: 2025-01-27

## 2025-01-27 ENCOUNTER — RESULTS FOLLOW-UP (OUTPATIENT)
Dept: INTERNAL MEDICINE CLINIC | Facility: CLINIC | Age: 25
End: 2025-01-27

## 2025-01-27 DIAGNOSIS — Z11.3 SCREENING FOR STDS (SEXUALLY TRANSMITTED DISEASES): ICD-10-CM

## 2025-01-27 DIAGNOSIS — Z13.1 SCREENING FOR DIABETES MELLITUS: ICD-10-CM

## 2025-01-27 DIAGNOSIS — D50.9 IRON DEFICIENCY ANEMIA, UNSPECIFIED IRON DEFICIENCY ANEMIA TYPE: ICD-10-CM

## 2025-01-27 DIAGNOSIS — Z13.6 SCREENING FOR CARDIOVASCULAR CONDITION: ICD-10-CM

## 2025-01-27 LAB
ALBUMIN SERPL BCG-MCNC: 4.1 G/DL (ref 3.5–5)
ALP SERPL-CCNC: 47 U/L (ref 34–104)
ALT SERPL W P-5'-P-CCNC: 8 U/L (ref 7–52)
ANION GAP SERPL CALCULATED.3IONS-SCNC: 8 MMOL/L (ref 4–13)
AST SERPL W P-5'-P-CCNC: 13 U/L (ref 13–39)
BASOPHILS # BLD AUTO: 0.02 THOUSANDS/ΜL (ref 0–0.1)
BASOPHILS NFR BLD AUTO: 0 % (ref 0–1)
BILIRUB SERPL-MCNC: 0.57 MG/DL (ref 0.2–1)
BUN SERPL-MCNC: 19 MG/DL (ref 5–25)
CALCIUM SERPL-MCNC: 9.1 MG/DL (ref 8.4–10.2)
CHLORIDE SERPL-SCNC: 104 MMOL/L (ref 96–108)
CHOLEST SERPL-MCNC: 191 MG/DL (ref ?–200)
CO2 SERPL-SCNC: 27 MMOL/L (ref 21–32)
CREAT SERPL-MCNC: 0.45 MG/DL (ref 0.6–1.3)
EOSINOPHIL # BLD AUTO: 0.14 THOUSAND/ΜL (ref 0–0.61)
EOSINOPHIL NFR BLD AUTO: 2 % (ref 0–6)
ERYTHROCYTE [DISTWIDTH] IN BLOOD BY AUTOMATED COUNT: 19.6 % (ref 11.6–15.1)
EST. AVERAGE GLUCOSE BLD GHB EST-MCNC: 97 MG/DL
FERRITIN SERPL-MCNC: 3 NG/ML (ref 11–307)
GFR SERPL CREATININE-BSD FRML MDRD: 142 ML/MIN/1.73SQ M
GLUCOSE P FAST SERPL-MCNC: 92 MG/DL (ref 65–99)
HBA1C MFR BLD: 5 %
HCT VFR BLD AUTO: 32.3 % (ref 34.8–46.1)
HDLC SERPL-MCNC: 64 MG/DL
HGB BLD-MCNC: 9.3 G/DL (ref 11.5–15.4)
IGA SERPL-MCNC: 236 MG/DL (ref 66–433)
IMM GRANULOCYTES # BLD AUTO: 0.03 THOUSAND/UL (ref 0–0.2)
IMM GRANULOCYTES NFR BLD AUTO: 0 % (ref 0–2)
IRON SATN MFR SERPL: 3 % (ref 15–50)
IRON SERPL-MCNC: 22 UG/DL (ref 50–212)
LDLC SERPL CALC-MCNC: 101 MG/DL (ref 0–100)
LYMPHOCYTES # BLD AUTO: 2.93 THOUSANDS/ΜL (ref 0.6–4.47)
LYMPHOCYTES NFR BLD AUTO: 32 % (ref 14–44)
MCH RBC QN AUTO: 19.1 PG (ref 26.8–34.3)
MCHC RBC AUTO-ENTMCNC: 28.8 G/DL (ref 31.4–37.4)
MCV RBC AUTO: 67 FL (ref 82–98)
MONOCYTES # BLD AUTO: 0.69 THOUSAND/ΜL (ref 0.17–1.22)
MONOCYTES NFR BLD AUTO: 8 % (ref 4–12)
NEUTROPHILS # BLD AUTO: 5.3 THOUSANDS/ΜL (ref 1.85–7.62)
NEUTS SEG NFR BLD AUTO: 58 % (ref 43–75)
NRBC BLD AUTO-RTO: 0 /100 WBCS
PLATELET # BLD AUTO: 485 THOUSANDS/UL (ref 149–390)
PMV BLD AUTO: 8.6 FL (ref 8.9–12.7)
POTASSIUM SERPL-SCNC: 3.7 MMOL/L (ref 3.5–5.3)
PROT SERPL-MCNC: 7.3 G/DL (ref 6.4–8.4)
RBC # BLD AUTO: 4.86 MILLION/UL (ref 3.81–5.12)
SODIUM SERPL-SCNC: 139 MMOL/L (ref 135–147)
TIBC SERPL-MCNC: 658 UG/DL (ref 250–450)
TRANSFERRIN SERPL-MCNC: 470 MG/DL (ref 203–362)
TRIGL SERPL-MCNC: 130 MG/DL (ref ?–150)
UIBC SERPL-MCNC: 636 UG/DL (ref 155–355)
WBC # BLD AUTO: 9.11 THOUSAND/UL (ref 4.31–10.16)

## 2025-01-27 PROCEDURE — 87591 N.GONORRHOEAE DNA AMP PROB: CPT

## 2025-01-27 PROCEDURE — 87491 CHLMYD TRACH DNA AMP PROBE: CPT

## 2025-01-27 PROCEDURE — 83550 IRON BINDING TEST: CPT

## 2025-01-27 PROCEDURE — 82728 ASSAY OF FERRITIN: CPT

## 2025-01-27 PROCEDURE — 83540 ASSAY OF IRON: CPT

## 2025-01-27 PROCEDURE — 86364 TISS TRNSGLTMNASE EA IG CLAS: CPT

## 2025-01-27 PROCEDURE — 36415 COLL VENOUS BLD VENIPUNCTURE: CPT

## 2025-01-27 PROCEDURE — 83036 HEMOGLOBIN GLYCOSYLATED A1C: CPT

## 2025-01-27 PROCEDURE — 85025 COMPLETE CBC W/AUTO DIFF WBC: CPT

## 2025-01-27 PROCEDURE — 80061 LIPID PANEL: CPT

## 2025-01-27 PROCEDURE — 82784 ASSAY IGA/IGD/IGG/IGM EACH: CPT

## 2025-01-27 PROCEDURE — 80053 COMPREHEN METABOLIC PANEL: CPT

## 2025-01-27 NOTE — TELEPHONE ENCOUNTER
Saw PCP 1/24/25. Referred to GI for Iron deficiency anemia.    Left message for patient to return call.

## 2025-01-27 NOTE — TELEPHONE ENCOUNTER
New pt referred by PCP, , to  for iron deficiency anemia. Pt is currently scheduled with FRANCINE Villanueva, 03/03. Pt is currently asymptomatic.  is currently booking OV's in June. Pt is asking if she could been seen by  sooner than March.

## 2025-01-27 NOTE — TELEPHONE ENCOUNTER
Spoke with pt she does not feel her menses is contributing to her blood loss as she has been controlled on birth control for a few years. She is agreeable to f/u with Gynecologist in the meantime. Ov with GI has been placed on waitlist

## 2025-01-27 NOTE — TELEPHONE ENCOUNTER
Patients GI provider:  CHARLEEN Moreno    Number to return call: [phone]    Reason for call: Pt calling to schedule appt - seen in the ER 1/24/25 due low iron being instructed to see GI / Appt scheduled but patient would like to see Dr. May, sooner if possible / Patient will await cb    Scheduled procedure/appointment date if applicable: 3/3/2025 @ 10:00 in [place]

## 2025-01-28 LAB
C TRACH DNA SPEC QL NAA+PROBE: NEGATIVE
N GONORRHOEA DNA SPEC QL NAA+PROBE: NEGATIVE
TTG IGA SER IA-ACNC: <0.4 U/ML (ref ?–10)

## 2025-03-02 NOTE — ASSESSMENT & PLAN NOTE
Assessment and plan 1.  Health maintenance annual wellness examination overall the patient is clinically stable and doing well, we encouraged the patient to follow a healthy and balanced diet.  We recommend that the patient exercise routinely approximately 30 minutes 5 times per week .  We have reviewed the patient's vaccines and have made recommendations for updates if necessary      .  We will be ordering screening laboratories which are age appropriate.  Return to the office in   1 year   call if any problems.

## 2025-04-04 ENCOUNTER — TELEPHONE (OUTPATIENT)
Dept: OTHER | Facility: OTHER | Age: 25
End: 2025-04-04

## 2025-04-04 NOTE — TELEPHONE ENCOUNTER
Pt called in, she had an appt for 4/11 but had to cancel it. She would like to reschedule but there was nothing until July. Please contact the pt and help her reschedule her follow up appt.

## 2025-04-07 ENCOUNTER — OFFICE VISIT (OUTPATIENT)
Dept: GASTROENTEROLOGY | Facility: CLINIC | Age: 25
End: 2025-04-07
Payer: COMMERCIAL

## 2025-04-07 VITALS
HEIGHT: 63 IN | SYSTOLIC BLOOD PRESSURE: 112 MMHG | BODY MASS INDEX: 23.14 KG/M2 | WEIGHT: 130.6 LBS | TEMPERATURE: 97.4 F | DIASTOLIC BLOOD PRESSURE: 74 MMHG

## 2025-04-07 DIAGNOSIS — K62.5 BRIGHT RED BLOOD PER RECTUM: Primary | ICD-10-CM

## 2025-04-07 DIAGNOSIS — K59.09 CHRONIC CONSTIPATION: ICD-10-CM

## 2025-04-07 DIAGNOSIS — D50.9 IRON DEFICIENCY ANEMIA, UNSPECIFIED IRON DEFICIENCY ANEMIA TYPE: ICD-10-CM

## 2025-04-07 PROCEDURE — 99204 OFFICE O/P NEW MOD 45 MIN: CPT | Performed by: PHYSICIAN ASSISTANT

## 2025-04-07 RX ORDER — SODIUM CHLORIDE, SODIUM LACTATE, POTASSIUM CHLORIDE, CALCIUM CHLORIDE 600; 310; 30; 20 MG/100ML; MG/100ML; MG/100ML; MG/100ML
125 INJECTION, SOLUTION INTRAVENOUS CONTINUOUS
OUTPATIENT
Start: 2025-04-07

## 2025-04-07 NOTE — ASSESSMENT & PLAN NOTE
Patient with chronic iron deficiency anemia.  She says that her pediatrician needs to have her on p.o. iron routinely but has not been on this in a few years.  She says she was undergoing workup with her PCP and said she would like to discuss iron supplementation options with her PCP at her appointment with him tomorrow.  She says she no longer gets heavy period since being on birth control.  Hemoglobin has been ranging around 7 since 2020 at least however repeat hemoglobin this past January was at 9.3.  Ferritin is very low at 3 but was actually worse in previous years at 1 and 2022 and 2 in 2021.  Celiac panel normal.  She underwent reticulocyte count and hemoglobin fractionation cascade in 2022 that were normal.  -TSH ordered  -I told the patient I am okay with her waiting until she sees her PCP tomorrow to discuss iron supplementation; I would recommend her PCP start her on IV Venofer however will await his recommendation  -EGD and colonoscopy ordered to further evaluate and rule out ulcer disease, AVMs, bleeding hemorrhoids or fissures, bleeding polyps, friable esophagitis or gastritis/erosions, malignancy, signs of IBD  Orders:    Ambulatory Referral to Gastroenterology    Colonoscopy; Future    EGD; Future    polyethylene glycol (GOLYTELY) 4000 mL solution; Take 4,000 mL by mouth once for 1 dose    TSH, 3rd generation with Free T4 reflex; Future

## 2025-04-07 NOTE — PATIENT INSTRUCTIONS
Scheduled date of EGD/colonoscopy (as of today): 6/11/25  Physician performing EGD/colonoscopy: Dr. May   Location of EGD/colonoscopy: [facility]  Desired bowel prep reviewed with patient: GOLYTELY  Instructions reviewed with patient by: Radha  Clearances:  N/A

## 2025-04-07 NOTE — PROGRESS NOTES
Name: Swathi Del Toro      : 2000      MRN: [id number]  Encounter Provider: Linda Moreno PA-C  Encounter Date: 2025   Encounter department: [facility] [place]  :  Assessment & Plan  Iron deficiency anemia, unspecified iron deficiency anemia type  Patient with chronic iron deficiency anemia.  She says that her pediatrician needs to have her on p.o. iron routinely but has not been on this in a few years.  She says she was undergoing workup with her PCP and said she would like to discuss iron supplementation options with her PCP at her appointment with him tomorrow.  She says she no longer gets heavy period since being on birth control.  Hemoglobin has been ranging around 7 since  at least however repeat hemoglobin this past January was at 9.3.  Ferritin is very low at 3 but was actually worse in previous years at  and  and 2 in .  Celiac panel normal.  She underwent reticulocyte count and hemoglobin fractionation cascade in  that were normal.  -TSH ordered  -I told the patient I am okay with her waiting until she sees her PCP tomorrow to discuss iron supplementation; I would recommend her PCP start her on IV Venofer however will await his recommendation  -EGD and colonoscopy ordered to further evaluate and rule out ulcer disease, AVMs, bleeding hemorrhoids or fissures, bleeding polyps, friable esophagitis or gastritis/erosions, malignancy, signs of IBD  Orders:    Ambulatory Referral to Gastroenterology    Colonoscopy; Future    EGD; Future    polyethylene glycol (GOLYTELY) 4000 mL solution; Take 4,000 mL by mouth once for 1 dose    TSH, 3rd generation with Free T4 reflex; Future    Bright red blood per rectum  Patient says that for many years she has had intermittent bright red blood both in the stool and with wiping that occurs once every few months.  She does admit to chronic constipation and says she suspects this usually occurs when she is  straining to move her bowels.  -Colonoscopy ordered       Chronic constipation  See above.  She says for many years that she is very young she has only moves her bowels once every 3 days or so.  She says she is comfortable with this and would prefer to hold off on being on any medications, supplements or laxatives at this time.  -We can hold off on starting her on fiber or MiraLAX however if the colonoscopy does show signs of hemorrhoids or fissures, I would like for her to be started on a regimen at that time if she would be agreeable           History of Present Illness   HPI  Swathi Del Toro is a 24 y.o. female who presents for consultation of BRB DC and anemia. Patient says that for many years she has had intermittent bright red blood both in the stool and with wiping that occurs once every few months.  She does admit to chronic constipation and says she suspects this usually occurs when she is straining to move her bowels.She says for many years that she is very young she has only moves her bowels once every 3 days or so.  She says she is comfortable with this and would prefer to hold off on being on any medications, supplements or laxatives at this time.  She denies nausea and vomiting, heartburn, abdominal pain, trouble swallowing or eating, family history of IBD, family history of colon cancer, unintentional weight loss, fevers, chills, night sweats, black bowel movements.  Patient is not on blood thinners.  Patient denies prior abdominal surgical history.  Patient denies frequent NSAID use.      Review of Systems   Constitutional:  Negative for activity change, appetite change, chills, diaphoresis, fatigue, fever and unexpected weight change.   HENT:  Negative for ear pain and sore throat.    Eyes:  Negative for pain and visual disturbance.   Respiratory:  Negative for cough and shortness of breath.    Cardiovascular:  Negative for chest pain and palpitations.   Gastrointestinal:  Positive for anal  "bleeding. Negative for abdominal distention, abdominal pain, blood in stool, constipation, diarrhea, nausea, rectal pain and vomiting.   Musculoskeletal:  Negative for arthralgias and back pain.   Skin:  Negative for color change and rash.   All other systems reviewed and are negative.         Objective   /74 (BP Location: Left arm, Patient Position: Sitting, Cuff Size: Adult)   Temp (!) 97.4 °F (36.3 °C) (Tympanic)   Ht 5' 3\" (1.6 m)   Wt 59.2 kg (130 lb 9.6 oz)   BMI 23.13 kg/m²      Physical Exam  Constitutional:       Appearance: Normal appearance.   Abdominal:      General: Bowel sounds are normal. There is no distension.      Palpations: There is no mass.      Tenderness: There is no abdominal tenderness. There is no guarding or rebound.   Neurological:      Mental Status: She is alert.         Administrative Statements   I have spent a total time of 30 minutes in caring for this patient on the day of the visit/encounter including Diagnostic results, Prognosis, Risks and benefits of tx options, Instructions for management, Patient and family education, Importance of tx compliance, Risk factor reductions, Impressions, Counseling / Coordination of care, Documenting in the medical record, Reviewing/placing orders in the medical record (including tests, medications, and/or procedures), Obtaining or reviewing history  , and Communicating with other healthcare professionals .  "

## 2025-04-08 ENCOUNTER — OFFICE VISIT (OUTPATIENT)
Dept: INTERNAL MEDICINE CLINIC | Facility: CLINIC | Age: 25
End: 2025-04-08
Payer: COMMERCIAL

## 2025-04-08 VITALS
SYSTOLIC BLOOD PRESSURE: 116 MMHG | WEIGHT: 133.2 LBS | HEART RATE: 89 BPM | BODY MASS INDEX: 22.74 KG/M2 | TEMPERATURE: 98.8 F | HEIGHT: 64 IN | OXYGEN SATURATION: 100 % | DIASTOLIC BLOOD PRESSURE: 72 MMHG

## 2025-04-08 DIAGNOSIS — Z23 NEED FOR COVID-19 VACCINE: Primary | ICD-10-CM

## 2025-04-08 DIAGNOSIS — D50.9 IRON DEFICIENCY ANEMIA, UNSPECIFIED IRON DEFICIENCY ANEMIA TYPE: ICD-10-CM

## 2025-04-08 PROCEDURE — 99213 OFFICE O/P EST LOW 20 MIN: CPT | Performed by: INTERNAL MEDICINE

## 2025-04-08 RX ORDER — SODIUM CHLORIDE 9 MG/ML
20 INJECTION, SOLUTION INTRAVENOUS ONCE
OUTPATIENT
Start: 2025-04-18

## 2025-04-08 NOTE — ASSESSMENT & PLAN NOTE
Chronic iron deficiency recently saw GI and will be going for colonoscopy and upper endoscopy    Orders:  •  CBC (Includes Diff/Plt) (Refl); Future  •  Iron Panel (Includes Ferritin, Iron Sat%, Iron, and TIBC); Future  Recent GI evaluation reviewed will be going for EGD colonoscopy the patient has had OTC iron and developed constipation and GI upset Rx for Venofer here 100 mg IV over 1 hour weekly for 4 weeks recheck CBC and iron panel

## 2025-04-08 NOTE — PROGRESS NOTES
Name: Swathi Del Toro      : 2000      MRN: [id number]  Encounter Provider: Quinten Ding DO  Encounter Date: 2025   [facility] department: [facility]  :  Assessment & Plan  Need for COVID-19 vaccine         Iron deficiency anemia, unspecified iron deficiency anemia type  Chronic iron deficiency recently saw GI and will be going for colonoscopy and upper endoscopy    Orders:  •  CBC (Includes Diff/Plt) (Refl); Future  •  Iron Panel (Includes Ferritin, Iron Sat%, Iron, and TIBC); Future  Recent GI evaluation reviewed will be going for EGD colonoscopy the patient has had OTC iron and developed constipation and GI upset Rx for Venofer here 100 mg IV over 1 hour weekly for 4 weeks recheck CBC and iron panel    RTO in 3 months call if any problems     History of Present Illness   HPI 24-year old female coming in for a follow up office visit regarding iron deficiency anemia today patient is here to review laboratories in detail she has been on OTC iron in the past but developed GI upset.  She is interested in senna.  She does report to me ice craving but no restless leg syndrome no fatigue no hair loss no brittle nails no myalgias.  Today we reviewed her laboratories in detail and she is interested in IV iron.  She has recently seen GI we reviewed that report and she will be going for colonoscopy upper endoscopy  Review of Systems   Constitutional:  Negative for activity change, appetite change and unexpected weight change.   HENT:  Negative for congestion and postnasal drip.    Eyes:  Negative for visual disturbance.   Respiratory:  Negative for cough and shortness of breath.    Cardiovascular:  Negative for chest pain.   Gastrointestinal:  Negative for abdominal pain, diarrhea, nausea and vomiting.   Neurological:  Negative for dizziness, light-headedness and headaches.   Hematological:  Negative for adenopathy.       Objective   /72 (BP Location: Left arm, Patient  "Position: Sitting, Cuff Size: Standard)   Pulse 89   Temp 98.8 °F (37.1 °C) (Tympanic)   Ht 5' 4\" (1.626 m)   Wt 60.4 kg (133 lb 3.2 oz)   SpO2 100%   BMI 22.86 kg/m²      Physical Exam  Nursing note reviewed.   Constitutional:       General: She is not in acute distress.     Appearance: Normal appearance. She is well-developed and normal weight. She is not ill-appearing, toxic-appearing or diaphoretic.   HENT:      Head: Normocephalic and atraumatic.      Right Ear: External ear normal.      Left Ear: External ear normal.      Nose: Nose normal.   Eyes:      Pupils: Pupils are equal, round, and reactive to light.   Cardiovascular:      Rate and Rhythm: Normal rate and regular rhythm.      Heart sounds: Normal heart sounds. No murmur heard.  Pulmonary:      Effort: Pulmonary effort is normal.      Breath sounds: Normal breath sounds.   Neurological:      Mental Status: She is alert.       Administrative Statements   I have spent a total time of 25 minutes in caring for this patient on the day of the visit/encounter including Diagnostic results, Impressions, Counseling / Coordination of care, Documenting in the medical record, Reviewing/placing orders in the medical record (including tests, medications, and/or procedures), and Obtaining or reviewing history  .  "

## 2025-04-22 ENCOUNTER — TELEPHONE (OUTPATIENT)
Dept: INFUSION CENTER | Facility: CLINIC | Age: 25
End: 2025-04-22

## 2025-04-22 NOTE — TELEPHONE ENCOUNTER
Attempted to complete new patient phone call, no answer. VM left r/e date, time, location, visitor policy with request to call back with any questions.

## 2025-04-23 DIAGNOSIS — D50.9 IRON DEFICIENCY ANEMIA, UNSPECIFIED IRON DEFICIENCY ANEMIA TYPE: Primary | ICD-10-CM

## 2025-04-23 RX ORDER — SODIUM CHLORIDE 9 MG/ML
20 INJECTION, SOLUTION INTRAVENOUS ONCE
Status: CANCELLED | OUTPATIENT
Start: 2025-04-24

## 2025-04-24 ENCOUNTER — HOSPITAL ENCOUNTER (OUTPATIENT)
Dept: INFUSION CENTER | Facility: CLINIC | Age: 25
Discharge: HOME | End: 2025-04-24
Attending: INTERNAL MEDICINE
Payer: COMMERCIAL

## 2025-04-24 VITALS
OXYGEN SATURATION: 98 % | HEART RATE: 73 BPM | RESPIRATION RATE: 16 BRPM | TEMPERATURE: 96.8 F | SYSTOLIC BLOOD PRESSURE: 105 MMHG | DIASTOLIC BLOOD PRESSURE: 64 MMHG

## 2025-04-24 DIAGNOSIS — D50.9 IRON DEFICIENCY ANEMIA, UNSPECIFIED IRON DEFICIENCY ANEMIA TYPE: Primary | ICD-10-CM

## 2025-04-24 PROCEDURE — 96365 THER/PROPH/DIAG IV INF INIT: CPT

## 2025-04-24 RX ORDER — SODIUM CHLORIDE 9 MG/ML
20 INJECTION, SOLUTION INTRAVENOUS ONCE
Status: COMPLETED | OUTPATIENT
Start: 2025-04-24 | End: 2025-04-24

## 2025-04-24 RX ORDER — SODIUM CHLORIDE 9 MG/ML
20 INJECTION, SOLUTION INTRAVENOUS ONCE
Status: CANCELLED | OUTPATIENT
Start: 2025-05-01

## 2025-04-24 RX ADMIN — IRON SUCROSE 100 MG: 20 INJECTION, SOLUTION INTRAVENOUS at 15:19

## 2025-04-24 RX ADMIN — SODIUM CHLORIDE 20 ML/HR: 0.9 INJECTION, SOLUTION INTRAVENOUS at 15:21

## 2025-04-24 NOTE — PROGRESS NOTES
Patient tolerated treatment without incident. Peripheral IV removed. Next appointment confirmed for 5/2/2025 at 1330 at [facility].

## 2025-04-28 DIAGNOSIS — D50.9 IRON DEFICIENCY ANEMIA, UNSPECIFIED IRON DEFICIENCY ANEMIA TYPE: Primary | ICD-10-CM

## 2025-04-28 RX ORDER — SODIUM CHLORIDE 9 MG/ML
20 INJECTION, SOLUTION INTRAVENOUS ONCE
Status: CANCELLED | OUTPATIENT
Start: 2025-05-02

## 2025-05-02 ENCOUNTER — HOSPITAL ENCOUNTER (OUTPATIENT)
Dept: INFUSION CENTER | Facility: HOSPITAL | Age: 25
End: 2025-05-02
Attending: INTERNAL MEDICINE
Payer: COMMERCIAL

## 2025-05-02 VITALS
TEMPERATURE: 97.7 F | DIASTOLIC BLOOD PRESSURE: 73 MMHG | RESPIRATION RATE: 16 BRPM | SYSTOLIC BLOOD PRESSURE: 112 MMHG | HEART RATE: 68 BPM

## 2025-05-02 DIAGNOSIS — D50.9 IRON DEFICIENCY ANEMIA, UNSPECIFIED IRON DEFICIENCY ANEMIA TYPE: Primary | ICD-10-CM

## 2025-05-02 PROCEDURE — 96365 THER/PROPH/DIAG IV INF INIT: CPT

## 2025-05-02 RX ORDER — SODIUM CHLORIDE 9 MG/ML
20 INJECTION, SOLUTION INTRAVENOUS ONCE
Status: COMPLETED | OUTPATIENT
Start: 2025-05-02 | End: 2025-05-02

## 2025-05-02 RX ORDER — SODIUM CHLORIDE 9 MG/ML
20 INJECTION, SOLUTION INTRAVENOUS ONCE
OUTPATIENT
Start: 2025-05-06

## 2025-05-02 RX ADMIN — IRON SUCROSE 100 MG: 20 INJECTION, SOLUTION INTRAVENOUS at 13:37

## 2025-05-02 RX ADMIN — SODIUM CHLORIDE 20 ML/HR: 9 INJECTION, SOLUTION INTRAVENOUS at 13:33

## 2025-05-02 NOTE — PROGRESS NOTES
Swathi Del Toro  tolerated IV Venofer well with no complications.      Swathi Del Toro is aware of future appt on 5/8 at 3pm.     AVS Declined    Left unit in stable condition.

## 2025-05-02 NOTE — PLAN OF CARE
Problem: HEMATOLOGIC - ADULT  Goal: Maintains hematologic stability  Description: INTERVENTIONS- Monitor labs- Administer Venofer as ordered  Outcome: Progressing

## 2025-05-08 ENCOUNTER — HOSPITAL ENCOUNTER (OUTPATIENT)
Dept: INFUSION CENTER | Facility: CLINIC | Age: 25
Discharge: HOME | End: 2025-05-08
Attending: INTERNAL MEDICINE
Payer: COMMERCIAL

## 2025-05-08 VITALS
RESPIRATION RATE: 16 BRPM | DIASTOLIC BLOOD PRESSURE: 69 MMHG | HEART RATE: 81 BPM | OXYGEN SATURATION: 100 % | TEMPERATURE: 97.7 F | SYSTOLIC BLOOD PRESSURE: 116 MMHG

## 2025-05-08 DIAGNOSIS — D50.9 IRON DEFICIENCY ANEMIA, UNSPECIFIED IRON DEFICIENCY ANEMIA TYPE: Primary | ICD-10-CM

## 2025-05-08 PROCEDURE — 96365 THER/PROPH/DIAG IV INF INIT: CPT

## 2025-05-08 RX ORDER — SODIUM CHLORIDE 9 MG/ML
20 INJECTION, SOLUTION INTRAVENOUS ONCE
Status: COMPLETED | OUTPATIENT
Start: 2025-05-08 | End: 2025-05-08

## 2025-05-08 RX ORDER — SODIUM CHLORIDE 9 MG/ML
20 INJECTION, SOLUTION INTRAVENOUS ONCE
Status: CANCELLED | OUTPATIENT
Start: 2025-05-09

## 2025-05-08 RX ADMIN — SODIUM CHLORIDE 20 ML/HR: 9 INJECTION, SOLUTION INTRAVENOUS at 15:22

## 2025-05-08 RX ADMIN — IRON SUCROSE 100 MG: 20 INJECTION, SOLUTION INTRAVENOUS at 15:22

## 2025-05-08 NOTE — PROGRESS NOTES
Patient tolerated treatment without issue. PIV removed L AC, bleeding controlled, gauze and coban in place. Patient confirmed next scheduled appointment 5/14 at 1300 at [facility] infusion center, declined AVS, ambulatory from department.

## 2025-05-08 NOTE — PROGRESS NOTES
Patient arrives for Venofer today. PIV placed by EN RN without issue, patient tolerated well. Patient resting on  chair, call bell within reach.

## 2025-05-12 DIAGNOSIS — D50.9 IRON DEFICIENCY ANEMIA, UNSPECIFIED IRON DEFICIENCY ANEMIA TYPE: Primary | ICD-10-CM

## 2025-05-14 ENCOUNTER — HOSPITAL ENCOUNTER (OUTPATIENT)
Dept: INFUSION CENTER | Facility: HOSPITAL | Age: 25
Discharge: HOME | End: 2025-05-14
Attending: INTERNAL MEDICINE
Payer: COMMERCIAL

## 2025-05-14 VITALS
DIASTOLIC BLOOD PRESSURE: 69 MMHG | SYSTOLIC BLOOD PRESSURE: 103 MMHG | TEMPERATURE: 97 F | HEART RATE: 81 BPM | RESPIRATION RATE: 16 BRPM

## 2025-05-14 DIAGNOSIS — D50.9 IRON DEFICIENCY ANEMIA, UNSPECIFIED IRON DEFICIENCY ANEMIA TYPE: Primary | ICD-10-CM

## 2025-05-14 PROCEDURE — 96365 THER/PROPH/DIAG IV INF INIT: CPT

## 2025-05-14 RX ORDER — SODIUM CHLORIDE 9 MG/ML
20 INJECTION, SOLUTION INTRAVENOUS ONCE
Status: COMPLETED | OUTPATIENT
Start: 2025-05-14 | End: 2025-05-14

## 2025-05-14 RX ORDER — SODIUM CHLORIDE 9 MG/ML
20 INJECTION, SOLUTION INTRAVENOUS ONCE
Status: CANCELLED | OUTPATIENT
Start: 2025-05-15

## 2025-05-14 RX ADMIN — SODIUM CHLORIDE 20 ML/HR: 0.9 INJECTION, SOLUTION INTRAVENOUS at 13:23

## 2025-05-14 RX ADMIN — IRON SUCROSE 100 MG: 20 INJECTION, SOLUTION INTRAVENOUS at 13:24

## 2025-05-14 NOTE — PROGRESS NOTES
Today complete's pt's venofer plan, no more appts at infusion center.  Pt aware to follow up with prescribing provider.

## 2025-05-14 NOTE — PLAN OF CARE
Problem: Potential for Falls  Goal: Patient will remain free of falls  Description: INTERVENTIONS:  - Educate patient/family on patient safety including physical limitations  - Instruct patient to call for assistance with activity   - Consider consulting OT/PT to assist with strengthening/mobility based on AM PAC & JH-HLM score  - Consult OT/PT to assist with strengthening/mobility   - Keep Call bell within reach  - Keep bed low and locked with side rails adjusted as appropriate  - Keep care items and personal belongings within reach  - Consider moving patient to room near nurses station  Outcome: Progressing

## 2025-05-19 ENCOUNTER — TELEPHONE (OUTPATIENT)
Dept: OBGYN CLINIC | Facility: CLINIC | Age: 25
End: 2025-05-19

## 2025-05-19 NOTE — TELEPHONE ENCOUNTER
Pt called with concerns of possible retained tampon. Reports small pieces of tampon when wiping. Has had pelvic pain with brown discharge the last two weeks. Denies fever. Appt scheduled 5/20 at 0830.

## 2025-05-19 NOTE — PROGRESS NOTES
Name: Swathi Del Toro      : 2000      MRN: [id number]  Encounter Provider: [facility] [place]  Encounter Date: 2025   Encounter department: [facility] [place]  :  Assessment & Plan  Superficial foreign body of vagina, initial encounter  Presented with concern for possible retained tampon  No tampon present on visual speculum or digital exam  Reassurance provided  RTO PRN           History of Present Illness   HPI  Swathi Del Toro is a 24 y.o. female who presents today with concern for a retained tampon.  She says that yesterday she noticed what appeared to be a string when she wiped. She has also had some bleeding since May 8th. She says it is some time more like brown discharge, but is not more similar to bleeding from a period. Her LMP was  so if in place, it would have been retained for about three weeks. She does not specifically remember placing a tampon that was never removed, but did consume alcohol at one point while on her period, so is worried it may be in place.   On exam there is no retained tampon.   Reassurance provided.  She stated that she has recently changed the day she starts her OCP on and was asking if that may be the cause of her irregular bleeding. We discussed that it may be secondary to that and discussed the importance of consistently taking her birth control. If she continues to have irregular bleeding, she knows to call the office.        History obtained from: patient    Review of Systems   Constitutional:  Negative for chills and fever.   Respiratory:  Negative for cough, shortness of breath and wheezing.    Cardiovascular:  Negative for chest pain and leg swelling.   Gastrointestinal:  Negative for abdominal pain, diarrhea, nausea and vomiting.   Genitourinary:  Positive for vaginal bleeding. Negative for pelvic pain and vaginal discharge.   Musculoskeletal:  Negative for back pain.   Neurological:  Negative for  weakness, light-headedness and headaches.          Objective   There were no vitals taken for this visit.     Physical Exam  Vitals and nursing note reviewed.   Constitutional:       General: She is not in acute distress.     Appearance: She is well-developed.   HENT:      Head: Normocephalic and atraumatic.     Eyes:      Conjunctiva/sclera: Conjunctivae normal.       Cardiovascular:      Rate and Rhythm: Normal rate and regular rhythm.      Pulses: Normal pulses.      Heart sounds: No murmur heard.  Pulmonary:      Effort: Pulmonary effort is normal. No respiratory distress.      Breath sounds: Normal breath sounds.   Abdominal:      Palpations: Abdomen is soft.      Tenderness: There is no abdominal tenderness.   Genitourinary:     Vagina: No signs of injury and foreign body.      Cervix: Normal.      Comments: Small amount of blood in the posterior vault on speculum exam   No tampon seen  Digital exam performed and no tampon was palpated    Musculoskeletal:         General: No swelling.      Cervical back: Neck supple.     Skin:     General: Skin is warm and dry.      Capillary Refill: Capillary refill takes less than 2 seconds.     Neurological:      Mental Status: She is alert.     Psychiatric:         Mood and Affect: Mood normal.         Administrative Statements   I have spent a total time of 30 minutes in caring for this patient on the day of the visit/encounter including Patient and family education, Impressions, Documenting in the medical record, Obtaining or reviewing history  , and Communicating with other healthcare professionals .    Sara Perry MD  OBGYN PGY-3  5/20/2025 9:14 AM

## 2025-05-20 ENCOUNTER — OFFICE VISIT (OUTPATIENT)
Dept: OBGYN CLINIC | Facility: CLINIC | Age: 25
End: 2025-05-20

## 2025-05-20 ENCOUNTER — TELEPHONE (OUTPATIENT)
Age: 25
End: 2025-05-20

## 2025-05-20 VITALS
HEART RATE: 83 BPM | BODY MASS INDEX: 23.57 KG/M2 | RESPIRATION RATE: 18 BRPM | HEIGHT: 63 IN | WEIGHT: 133 LBS | DIASTOLIC BLOOD PRESSURE: 62 MMHG | SYSTOLIC BLOOD PRESSURE: 107 MMHG

## 2025-05-20 DIAGNOSIS — S30.854A: Primary | ICD-10-CM

## 2025-05-20 NOTE — TELEPHONE ENCOUNTER
Patient called to find out when she would be due for blood work and a return visit as she finished her Venofer infusions 1 week ago.  Advised patient that orders were placed to have labs done and she will be due to return to the office in 3 months as per the 4/8/25 office note.  Patient expressed understanding and will go to have labs drawn.  She will call back to schedule the 3 month follow up.

## 2025-06-11 ENCOUNTER — ANESTHESIA EVENT (OUTPATIENT)
Dept: GASTROENTEROLOGY | Facility: HOSPITAL | Age: 25
End: 2025-06-11
Payer: COMMERCIAL

## 2025-06-11 ENCOUNTER — HOSPITAL ENCOUNTER (OUTPATIENT)
Dept: GASTROENTEROLOGY | Facility: HOSPITAL | Age: 25
Setting detail: OUTPATIENT SURGERY
Discharge: HOME | End: 2025-06-11
Attending: PHYSICIAN ASSISTANT
Payer: COMMERCIAL

## 2025-06-11 ENCOUNTER — ANESTHESIA (OUTPATIENT)
Dept: GASTROENTEROLOGY | Facility: HOSPITAL | Age: 25
End: 2025-06-11
Payer: COMMERCIAL

## 2025-06-11 VITALS
DIASTOLIC BLOOD PRESSURE: 55 MMHG | HEART RATE: 70 BPM | OXYGEN SATURATION: 100 % | HEIGHT: 64 IN | WEIGHT: 133 LBS | TEMPERATURE: 97.1 F | RESPIRATION RATE: 16 BRPM | SYSTOLIC BLOOD PRESSURE: 103 MMHG | BODY MASS INDEX: 22.71 KG/M2

## 2025-06-11 DIAGNOSIS — D50.9 IRON DEFICIENCY ANEMIA, UNSPECIFIED IRON DEFICIENCY ANEMIA TYPE: ICD-10-CM

## 2025-06-11 LAB
EXT PREGNANCY TEST URINE: NEGATIVE
EXT. CONTROL: NORMAL

## 2025-06-11 PROCEDURE — 88305 TISSUE EXAM BY PATHOLOGIST: CPT | Performed by: PATHOLOGY

## 2025-06-11 PROCEDURE — 45378 DIAGNOSTIC COLONOSCOPY: CPT | Performed by: INTERNAL MEDICINE

## 2025-06-11 PROCEDURE — 43239 EGD BIOPSY SINGLE/MULTIPLE: CPT | Performed by: INTERNAL MEDICINE

## 2025-06-11 PROCEDURE — 81025 URINE PREGNANCY TEST: CPT | Performed by: STUDENT IN AN ORGANIZED HEALTH CARE EDUCATION/TRAINING PROGRAM

## 2025-06-11 RX ORDER — LIDOCAINE HYDROCHLORIDE 10 MG/ML
INJECTION, SOLUTION EPIDURAL; INFILTRATION; INTRACAUDAL; PERINEURAL AS NEEDED
Status: DISCONTINUED | OUTPATIENT
Start: 2025-06-11 | End: 2025-06-11

## 2025-06-11 RX ORDER — FENTANYL CITRATE 50 UG/ML
INJECTION, SOLUTION INTRAMUSCULAR; INTRAVENOUS AS NEEDED
Status: DISCONTINUED | OUTPATIENT
Start: 2025-06-11 | End: 2025-06-11

## 2025-06-11 RX ORDER — SODIUM CHLORIDE, SODIUM LACTATE, POTASSIUM CHLORIDE, CALCIUM CHLORIDE 600; 310; 30; 20 MG/100ML; MG/100ML; MG/100ML; MG/100ML
125 INJECTION, SOLUTION INTRAVENOUS CONTINUOUS
Status: DISCONTINUED | OUTPATIENT
Start: 2025-06-11 | End: 2025-06-15 | Stop reason: HOSPADM

## 2025-06-11 RX ORDER — SODIUM CHLORIDE 9 MG/ML
INJECTION, SOLUTION INTRAVENOUS CONTINUOUS PRN
Status: DISCONTINUED | OUTPATIENT
Start: 2025-06-11 | End: 2025-06-11

## 2025-06-11 RX ORDER — PROPOFOL 10 MG/ML
INJECTION, EMULSION INTRAVENOUS AS NEEDED
Status: DISCONTINUED | OUTPATIENT
Start: 2025-06-11 | End: 2025-06-11

## 2025-06-11 RX ADMIN — PROPOFOL 150 MG: 10 INJECTION, EMULSION INTRAVENOUS at 10:44

## 2025-06-11 RX ADMIN — PROPOFOL 50 MG: 10 INJECTION, EMULSION INTRAVENOUS at 10:48

## 2025-06-11 RX ADMIN — FENTANYL CITRATE 25 MCG: 50 INJECTION INTRAMUSCULAR; INTRAVENOUS at 10:44

## 2025-06-11 RX ADMIN — LIDOCAINE HYDROCHLORIDE 50 MG: 10 INJECTION, SOLUTION EPIDURAL; INFILTRATION; INTRACAUDAL; PERINEURAL at 10:44

## 2025-06-11 RX ADMIN — SODIUM CHLORIDE: 0.9 INJECTION, SOLUTION INTRAVENOUS at 10:39

## 2025-06-11 RX ADMIN — PROPOFOL 50 MG: 10 INJECTION, EMULSION INTRAVENOUS at 10:57

## 2025-06-11 RX ADMIN — PROPOFOL 100 MCG/KG/MIN: 10 INJECTION, EMULSION INTRAVENOUS at 10:45

## 2025-06-11 NOTE — ANESTHESIA PREPROCEDURE EVALUATION
Procedure:  COLONOSCOPY  EGD    Relevant Problems   CARDIO   (+) Shortness of breath on exertion      HEMATOLOGY   (+) Iron deficiency anemia      PULMONARY   (+) Shortness of breath on exertion      NPO 6/11 530 prep     Physical Exam    Airway     Mallampati score: I  TM Distance: >3 FB  Neck ROM: full      Cardiovascular  Cardiovascular exam normal    Dental       Pulmonary  Pulmonary exam normal     Neurological      Other Findings  post-pubertal.      Anesthesia Plan  ASA Score- 2     Anesthesia Type- IV sedation with anesthesia with ASA Monitors.         Additional Monitors:     Airway Plan:            Plan Factors-Exercise tolerance (METS): >4 METS.    Chart reviewed. EKG reviewed.  Existing labs reviewed. Patient summary reviewed.          Obstructive sleep apnea risk education given perioperatively.        Induction- intravenous.    Postoperative Plan- .   Monitoring Plan - Monitoring plan - standard ASA monitoring      Perioperative Resuscitation Plan - Level 1 - Full Code.       Informed Consent- Anesthetic plan and risks discussed with patient.  I personally reviewed this patient with the CRNA. Discussed and agreed on the Anesthesia Plan with the CRNA..      NPO Status:  Vitals Value Taken Time   Date of last liquid 06/11/25 06/11/25 09:41   Time of last liquid 0530 06/11/25 09:41   Date of last solid 06/09/25 06/11/25 09:41   Time of last solid 2330 06/11/25 09:41

## 2025-06-11 NOTE — H&P
"History and Physical -  [facility]  Swathi Del Toro 24 y.o. female MRN: [id number]                  HPI: Swathi Del Toro is a 24 y.o. year old female who presents for EGD and colonoscopy for iron deficiency anemia.      REVIEW OF SYSTEMS: Per the HPI, and otherwise unremarkable.    Historical Information   Past Medical History[1]  Past Surgical History[2]  Social History   Social History     Substance and Sexual Activity   Alcohol Use Yes    Comment: socially     Social History     Substance and Sexual Activity   Drug Use Never     Tobacco Use History[3]  Family History[4]    Meds/Allergies     Current Medications[5]    Allergies[6]    Objective     /77   Pulse 76   Temp 98.6 °F (37 °C) (Tympanic)   Resp 14   Ht 5' 4\" (1.626 m)   Wt 60.3 kg (133 lb)   LMP 05/22/2025 (Exact Date)   SpO2 99%   BMI 22.83 kg/m²       PHYSICAL EXAM    Gen: NAD  Head: NCAT  CV: RRR  CHEST: Clear  ABD: soft, NT/ND  EXT: no edema      ASSESSMENT/PLAN:  This is a 24 y.o. year old female here for EGD and colonoscopy, and she is stable and optimized for her procedure.             [1]   Past Medical History:  Diagnosis Date    Anemia    [2]   Past Surgical History:  Procedure Laterality Date    WISDOM TOOTH EXTRACTION  2017   [3]   Social History  Tobacco Use   Smoking Status Never   Smokeless Tobacco Never   [4]   Family History  Problem Relation Name Age of Onset    No Known Problems Mother      No Known Problems Father      Diabetes Paternal Grandmother      Stroke Paternal Grandmother      Substance Abuse Neg Hx      Mental illness Neg Hx      Breast cancer Neg Hx      Colon cancer Neg Hx      Ovarian cancer Neg Hx      Heart attack Neg Hx     [5]   Current Outpatient Medications:     norgestimate-ethinyl estradiol (Tri-[place]) 0.18/0.215/0.25 MG-25 MCG per tablet    polyethylene glycol (GOLYTELY) 4000 mL solution    Current Facility-Administered Medications:     lactated ringers infusion, 125 " mL/hr, Intravenous, Continuous  [6] No Known Allergies

## 2025-06-11 NOTE — ANESTHESIA POSTPROCEDURE EVALUATION
Post-Op Assessment Note    CV Status:  Stable    Pain management: adequate       Mental Status:  Alert and awake   Hydration Status:  Euvolemic   PONV Controlled:  Controlled   Airway Patency:  Patent     Post Op Vitals Reviewed: Yes    No anethesia notable event occurred.    Staff: Anesthesiologist           Last Filed PACU Vitals:  Vitals Value Taken Time   Temp 97.1 °F (36.2 °C) 06/11/25 11:11   Pulse 73 06/11/25 11:11   /59 06/11/25 11:11   Resp 16 06/11/25 11:11   SpO2 100 % 06/11/25 11:11

## 2025-06-16 ENCOUNTER — RESULTS FOLLOW-UP (OUTPATIENT)
Dept: GASTROENTEROLOGY | Facility: CLINIC | Age: 25
End: 2025-06-16

## 2025-06-16 PROCEDURE — 88305 TISSUE EXAM BY PATHOLOGIST: CPT | Performed by: PATHOLOGY

## 2025-07-22 ENCOUNTER — APPOINTMENT (OUTPATIENT)
Dept: LAB | Facility: CLINIC | Age: 25
End: 2025-07-22
Attending: PHYSICIAN ASSISTANT
Payer: COMMERCIAL

## 2025-07-22 DIAGNOSIS — D50.9 IRON DEFICIENCY ANEMIA, UNSPECIFIED IRON DEFICIENCY ANEMIA TYPE: ICD-10-CM

## 2025-07-22 LAB
BASOPHILS # BLD AUTO: 0.03 THOUSANDS/ÂΜL (ref 0–0.1)
BASOPHILS NFR BLD AUTO: 0 % (ref 0–1)
EOSINOPHIL # BLD AUTO: 0.1 THOUSAND/ÂΜL (ref 0–0.61)
EOSINOPHIL NFR BLD AUTO: 2 % (ref 0–6)
ERYTHROCYTE [DISTWIDTH] IN BLOOD BY AUTOMATED COUNT: 18.6 % (ref 11.6–15.1)
FERRITIN SERPL-MCNC: 2 NG/ML (ref 30–307)
HCT VFR BLD AUTO: 39.1 % (ref 34.8–46.1)
HGB BLD-MCNC: 12.1 G/DL (ref 11.5–15.4)
IMM GRANULOCYTES # BLD AUTO: 0.02 THOUSAND/UL (ref 0–0.2)
IMM GRANULOCYTES NFR BLD AUTO: 0 % (ref 0–2)
IRON SATN MFR SERPL: 3 % (ref 15–50)
IRON SERPL-MCNC: 18 UG/DL (ref 50–212)
LYMPHOCYTES # BLD AUTO: 2.64 THOUSANDS/ÂΜL (ref 0.6–4.47)
LYMPHOCYTES NFR BLD AUTO: 40 % (ref 14–44)
MCH RBC QN AUTO: 24.2 PG (ref 26.8–34.3)
MCHC RBC AUTO-ENTMCNC: 30.9 G/DL (ref 31.4–37.4)
MCV RBC AUTO: 78 FL (ref 82–98)
MONOCYTES # BLD AUTO: 0.47 THOUSAND/ÂΜL (ref 0.17–1.22)
MONOCYTES NFR BLD AUTO: 7 % (ref 4–12)
NEUTROPHILS # BLD AUTO: 3.43 THOUSANDS/ÂΜL (ref 1.85–7.62)
NEUTS SEG NFR BLD AUTO: 51 % (ref 43–75)
NRBC BLD AUTO-RTO: 0 /100 WBCS
PLATELET # BLD AUTO: 416 THOUSANDS/UL (ref 149–390)
PMV BLD AUTO: 8.9 FL (ref 8.9–12.7)
RBC # BLD AUTO: 4.99 MILLION/UL (ref 3.81–5.12)
TIBC SERPL-MCNC: 648.2 UG/DL (ref 250–450)
TRANSFERRIN SERPL-MCNC: 463 MG/DL (ref 203–362)
TSH SERPL DL<=0.05 MIU/L-ACNC: 2.1 UIU/ML (ref 0.45–4.5)
UIBC SERPL-MCNC: 630 UG/DL (ref 155–355)
WBC # BLD AUTO: 6.69 THOUSAND/UL (ref 4.31–10.16)

## 2025-07-22 PROCEDURE — 83540 ASSAY OF IRON: CPT

## 2025-07-22 PROCEDURE — 83550 IRON BINDING TEST: CPT

## 2025-07-22 PROCEDURE — 36415 COLL VENOUS BLD VENIPUNCTURE: CPT

## 2025-07-22 PROCEDURE — 82728 ASSAY OF FERRITIN: CPT

## 2025-07-22 PROCEDURE — 85025 COMPLETE CBC W/AUTO DIFF WBC: CPT

## 2025-07-22 PROCEDURE — 84443 ASSAY THYROID STIM HORMONE: CPT

## 2025-07-31 ENCOUNTER — TELEMEDICINE (OUTPATIENT)
Dept: GASTROENTEROLOGY | Facility: CLINIC | Age: 25
End: 2025-07-31
Payer: COMMERCIAL

## 2025-07-31 DIAGNOSIS — D50.9 IRON DEFICIENCY ANEMIA, UNSPECIFIED IRON DEFICIENCY ANEMIA TYPE: Primary | ICD-10-CM

## 2025-07-31 PROCEDURE — 99213 OFFICE O/P EST LOW 20 MIN: CPT | Performed by: PHYSICIAN ASSISTANT

## 2025-08-01 ENCOUNTER — OFFICE VISIT (OUTPATIENT)
Dept: INTERNAL MEDICINE CLINIC | Facility: CLINIC | Age: 25
End: 2025-08-01
Payer: COMMERCIAL

## 2025-08-01 VITALS
WEIGHT: 133.4 LBS | HEIGHT: 64 IN | SYSTOLIC BLOOD PRESSURE: 106 MMHG | DIASTOLIC BLOOD PRESSURE: 70 MMHG | HEART RATE: 88 BPM | BODY MASS INDEX: 22.77 KG/M2 | OXYGEN SATURATION: 99 % | RESPIRATION RATE: 16 BRPM

## 2025-08-01 DIAGNOSIS — D50.9 IRON DEFICIENCY ANEMIA, UNSPECIFIED IRON DEFICIENCY ANEMIA TYPE: Primary | ICD-10-CM

## 2025-08-01 PROCEDURE — 99213 OFFICE O/P EST LOW 20 MIN: CPT | Performed by: INTERNAL MEDICINE

## 2025-08-01 RX ORDER — SODIUM CHLORIDE 9 MG/ML
20 INJECTION, SOLUTION INTRAVENOUS ONCE
OUTPATIENT
Start: 2025-08-04

## 2025-08-01 RX ORDER — SODIUM CHLORIDE 9 MG/ML
20 INJECTION, SOLUTION INTRAVENOUS ONCE
Status: CANCELLED | OUTPATIENT
Start: 2025-08-01